# Patient Record
Sex: FEMALE | Race: WHITE | NOT HISPANIC OR LATINO | URBAN - METROPOLITAN AREA
[De-identification: names, ages, dates, MRNs, and addresses within clinical notes are randomized per-mention and may not be internally consistent; named-entity substitution may affect disease eponyms.]

---

## 2017-02-05 ENCOUNTER — INPATIENT (INPATIENT)
Facility: HOSPITAL | Age: 82
LOS: 1 days | Discharge: HOME | DRG: 312 | End: 2017-02-07
Attending: INTERNAL MEDICINE | Admitting: INTERNAL MEDICINE
Payer: MEDICARE

## 2017-02-05 VITALS
OXYGEN SATURATION: 100 % | TEMPERATURE: 98 F | DIASTOLIC BLOOD PRESSURE: 61 MMHG | HEIGHT: 59.06 IN | HEART RATE: 63 BPM | WEIGHT: 130.07 LBS | RESPIRATION RATE: 19 BRPM | SYSTOLIC BLOOD PRESSURE: 178 MMHG

## 2017-02-05 DIAGNOSIS — S22.000A WEDGE COMPRESSION FRACTURE OF UNSPECIFIED THORACIC VERTEBRA, INITIAL ENCOUNTER FOR CLOSED FRACTURE: ICD-10-CM

## 2017-02-05 LAB
ALBUMIN SERPL ELPH-MCNC: 3.9 G/DL — SIGNIFICANT CHANGE UP (ref 3.5–5)
ALP SERPL-CCNC: 46 U/L — SIGNIFICANT CHANGE UP (ref 40–120)
ALT FLD-CCNC: 30 U/L DA — SIGNIFICANT CHANGE UP (ref 10–60)
ANION GAP SERPL CALC-SCNC: 8 MMOL/L — SIGNIFICANT CHANGE UP (ref 5–17)
APTT BLD: 27.7 SEC — SIGNIFICANT CHANGE UP (ref 27.5–37.4)
AST SERPL-CCNC: 24 U/L — SIGNIFICANT CHANGE UP (ref 10–40)
BASOPHILS # BLD AUTO: 0.1 K/UL — SIGNIFICANT CHANGE UP (ref 0–0.2)
BASOPHILS NFR BLD AUTO: 1.1 % — SIGNIFICANT CHANGE UP (ref 0–2)
BILIRUB SERPL-MCNC: 0.2 MG/DL — SIGNIFICANT CHANGE UP (ref 0.2–1.2)
BUN SERPL-MCNC: 14 MG/DL — SIGNIFICANT CHANGE UP (ref 7–18)
CALCIUM SERPL-MCNC: 8.9 MG/DL — SIGNIFICANT CHANGE UP (ref 8.4–10.5)
CHLORIDE SERPL-SCNC: 101 MMOL/L — SIGNIFICANT CHANGE UP (ref 96–108)
CO2 SERPL-SCNC: 28 MMOL/L — SIGNIFICANT CHANGE UP (ref 22–31)
CREAT SERPL-MCNC: 0.87 MG/DL — SIGNIFICANT CHANGE UP (ref 0.5–1.3)
EOSINOPHIL # BLD AUTO: 0.1 K/UL — SIGNIFICANT CHANGE UP (ref 0–0.5)
EOSINOPHIL NFR BLD AUTO: 2.8 % — SIGNIFICANT CHANGE UP (ref 0–6)
GLUCOSE SERPL-MCNC: 130 MG/DL — HIGH (ref 70–99)
HCT VFR BLD CALC: 41.6 % — SIGNIFICANT CHANGE UP (ref 34.5–45)
HGB BLD-MCNC: 13 G/DL — SIGNIFICANT CHANGE UP (ref 11.5–15.5)
INR BLD: 1.04 RATIO — SIGNIFICANT CHANGE UP (ref 0.88–1.16)
LYMPHOCYTES # BLD AUTO: 2.1 K/UL — SIGNIFICANT CHANGE UP (ref 1–3.3)
LYMPHOCYTES # BLD AUTO: 41.2 % — SIGNIFICANT CHANGE UP (ref 13–44)
MCHC RBC-ENTMCNC: 26.7 PG — LOW (ref 27–34)
MCHC RBC-ENTMCNC: 31.2 GM/DL — LOW (ref 32–36)
MCV RBC AUTO: 85.6 FL — SIGNIFICANT CHANGE UP (ref 80–100)
MONOCYTES # BLD AUTO: 0.3 K/UL — SIGNIFICANT CHANGE UP (ref 0–0.9)
MONOCYTES NFR BLD AUTO: 5.9 % — SIGNIFICANT CHANGE UP (ref 2–14)
NEUTROPHILS # BLD AUTO: 2.5 K/UL — SIGNIFICANT CHANGE UP (ref 1.8–7.4)
NEUTROPHILS NFR BLD AUTO: 48.9 % — SIGNIFICANT CHANGE UP (ref 43–77)
PLATELET # BLD AUTO: 136 K/UL — LOW (ref 150–400)
POTASSIUM SERPL-MCNC: 4.2 MMOL/L — SIGNIFICANT CHANGE UP (ref 3.5–5.3)
POTASSIUM SERPL-SCNC: 4.2 MMOL/L — SIGNIFICANT CHANGE UP (ref 3.5–5.3)
PROT SERPL-MCNC: 9.1 G/DL — HIGH (ref 6–8.3)
PROTHROM AB SERPL-ACNC: 11.6 SEC — SIGNIFICANT CHANGE UP (ref 10–13.1)
RBC # BLD: 4.86 M/UL — SIGNIFICANT CHANGE UP (ref 3.8–5.2)
RBC # FLD: 11.8 % — SIGNIFICANT CHANGE UP (ref 10.3–14.5)
SODIUM SERPL-SCNC: 137 MMOL/L — SIGNIFICANT CHANGE UP (ref 135–145)
WBC # BLD: 5.1 K/UL — SIGNIFICANT CHANGE UP (ref 3.8–10.5)
WBC # FLD AUTO: 5.1 K/UL — SIGNIFICANT CHANGE UP (ref 3.8–10.5)

## 2017-02-05 PROCEDURE — 99285 EMERGENCY DEPT VISIT HI MDM: CPT

## 2017-02-05 PROCEDURE — 70450 CT HEAD/BRAIN W/O DYE: CPT | Mod: 26

## 2017-02-05 PROCEDURE — 72131 CT LUMBAR SPINE W/O DYE: CPT | Mod: 26

## 2017-02-05 PROCEDURE — 71101 X-RAY EXAM UNILAT RIBS/CHEST: CPT | Mod: 26

## 2017-02-05 RX ORDER — DEXAMETHASONE 0.5 MG/5ML
10 ELIXIR ORAL ONCE
Qty: 0 | Refills: 0 | Status: COMPLETED | OUTPATIENT
Start: 2017-02-05 | End: 2017-02-05

## 2017-02-05 RX ORDER — IBUPROFEN 200 MG
600 TABLET ORAL ONCE
Qty: 0 | Refills: 0 | Status: COMPLETED | OUTPATIENT
Start: 2017-02-05 | End: 2017-02-05

## 2017-02-05 RX ORDER — MECLIZINE HCL 12.5 MG
25 TABLET ORAL ONCE
Qty: 0 | Refills: 0 | Status: COMPLETED | OUTPATIENT
Start: 2017-02-05 | End: 2017-02-05

## 2017-02-05 RX ADMIN — Medication 600 MILLIGRAM(S): at 22:12

## 2017-02-05 RX ADMIN — Medication 600 MILLIGRAM(S): at 23:00

## 2017-02-05 RX ADMIN — Medication 10 MILLIGRAM(S): at 20:57

## 2017-02-05 RX ADMIN — Medication 25 MILLIGRAM(S): at 22:12

## 2017-02-05 NOTE — ED PROVIDER NOTE - CONDUCTED A DETAILED DISCUSSION WITH PATIENT AND/OR GUARDIAN REGARDING, MDM
need for outpatient follow-up/return to ED if symptoms worsen, persist or questions arise/lab results radiology results/return to ED if symptoms worsen, persist or questions arise/need for outpatient follow-up/lab results

## 2017-02-05 NOTE — ED PROVIDER NOTE - NS ED MD SCRIBE ATTENDING SCRIBE SECTIONS
DISPOSITION/HIV/PAST MEDICAL/SURGICAL/SOCIAL HISTORY/REVIEW OF SYSTEMS/PHYSICAL EXAM/HISTORY OF PRESENT ILLNESS/VITAL SIGNS( [place])

## 2017-02-05 NOTE — ED PROVIDER NOTE - MEDICAL DECISION MAKING DETAILS
Pt with mechanical fall without dizziness yesterday, no gross neuro deficits on exam. Will get CT lumbar spine, EKG, then reassess. Pt with mechanical fall yesterday, no neuro deficits on exam. Will get CT lumbar spine, xray, EKG, labs and re-assess.  Ct shows compression fracture. Will admit

## 2017-02-05 NOTE — ED ADULT NURSE NOTE - OBJECTIVE STATEMENT
as per the daughter pt had the syncope episode in [employer] . she is being having theses episodes after the hip replacement

## 2017-02-05 NOTE — ED PROVIDER NOTE - ATTENDING CONTRIBUTION TO CARE
87yo F w mechanical fall yesterday w L sided rib pains and mid back pains. No neuro stx. Tender to T12-L2 area, neuro intact. Will treat, get Xrays/CT, will reevaluate

## 2017-02-05 NOTE — ED PROVIDER NOTE - OBJECTIVE STATEMENT
87 y/o female with PMHx of HTN and chronic dizziness presents to the ED for fall pt reports yesterday while waking without a cane she developed a gradual onset of spinning sensation described as dizziness (not new from baseline). While going down the stairs, she lost balance at the last step and fell forward and landed on the L side. Pt feels spinning sensations with changes of position. Pt now c/o of back pain and L sided rib pain. Pt has been f/u with her PMD for chronic dizziness in the last year with multiple CT scans and MRIs and is currently on Meclizine. Pt denies different presentation of Sx than last year. Pt denies head injury, LOC, CP, palpitation, SOB, prior or post fall, visual changes, nausea, vomiting, confusion, sensory motor deficits, or any other complaints. 87 y/o female with PMHx of HTN and chronic dizziness presents to the ED for fall pt reports yesterday while waking without a cane she developed spinning sensation described as dizziness (not new from baseline). While going down the stairs, she lost balance at the last step and fell forward and landed on the L side. Pt now c/o of back pain and L sided rib pain. Pt has been f/u with her PMD for chronic dizziness in the last year with multiple CT scans and MRIs and is currently on Meclizine. Pt denies different presentation of Sx than last year. Pt denies head injury, LOC, CP, palpitation, SOB, prior or post fall, visual changes, nausea, vomiting, confusion, sensory motor deficits, or any other complaints.

## 2017-02-06 DIAGNOSIS — Z96.642 PRESENCE OF LEFT ARTIFICIAL HIP JOINT: Chronic | ICD-10-CM

## 2017-02-06 DIAGNOSIS — S22.000A WEDGE COMPRESSION FRACTURE OF UNSPECIFIED THORACIC VERTEBRA, INITIAL ENCOUNTER FOR CLOSED FRACTURE: ICD-10-CM

## 2017-02-06 DIAGNOSIS — F32.9 MAJOR DEPRESSIVE DISORDER, SINGLE EPISODE, UNSPECIFIED: ICD-10-CM

## 2017-02-06 DIAGNOSIS — Z90.710 ACQUIRED ABSENCE OF BOTH CERVIX AND UTERUS: Chronic | ICD-10-CM

## 2017-02-06 DIAGNOSIS — Z41.8 ENCOUNTER FOR OTHER PROCEDURES FOR PURPOSES OTHER THAN REMEDYING HEALTH STATE: ICD-10-CM

## 2017-02-06 DIAGNOSIS — I10 ESSENTIAL (PRIMARY) HYPERTENSION: ICD-10-CM

## 2017-02-06 DIAGNOSIS — R55 SYNCOPE AND COLLAPSE: ICD-10-CM

## 2017-02-06 LAB
ANION GAP SERPL CALC-SCNC: 9 MMOL/L — SIGNIFICANT CHANGE UP (ref 5–17)
BASOPHILS # BLD AUTO: 0 K/UL — SIGNIFICANT CHANGE UP (ref 0–0.2)
BASOPHILS NFR BLD AUTO: 0.4 % — SIGNIFICANT CHANGE UP (ref 0–2)
BUN SERPL-MCNC: 19 MG/DL — HIGH (ref 7–18)
CALCIUM SERPL-MCNC: 8.8 MG/DL — SIGNIFICANT CHANGE UP (ref 8.4–10.5)
CHLORIDE SERPL-SCNC: 104 MMOL/L — SIGNIFICANT CHANGE UP (ref 96–108)
CHOLEST SERPL-MCNC: 183 MG/DL — SIGNIFICANT CHANGE UP (ref 10–199)
CK MB BLD-MCNC: <1.2 % — SIGNIFICANT CHANGE UP (ref 0–3.5)
CK MB CFR SERPL CALC: <1 NG/ML — SIGNIFICANT CHANGE UP (ref 0–3.6)
CK SERPL-CCNC: 81 U/L — SIGNIFICANT CHANGE UP (ref 21–215)
CO2 SERPL-SCNC: 27 MMOL/L — SIGNIFICANT CHANGE UP (ref 22–31)
CREAT SERPL-MCNC: 0.85 MG/DL — SIGNIFICANT CHANGE UP (ref 0.5–1.3)
EOSINOPHIL # BLD AUTO: 0 K/UL — SIGNIFICANT CHANGE UP (ref 0–0.5)
EOSINOPHIL NFR BLD AUTO: 0.3 % — SIGNIFICANT CHANGE UP (ref 0–6)
FOLATE SERPL-MCNC: 11 NG/ML — SIGNIFICANT CHANGE UP (ref 4.8–24.2)
GLUCOSE SERPL-MCNC: 161 MG/DL — HIGH (ref 70–99)
HBA1C BLD-MCNC: 5.9 % — HIGH (ref 4–5.6)
HCT VFR BLD CALC: 39.5 % — SIGNIFICANT CHANGE UP (ref 34.5–45)
HDLC SERPL-MCNC: 59 MG/DL — SIGNIFICANT CHANGE UP (ref 40–125)
HGB BLD-MCNC: 12.5 G/DL — SIGNIFICANT CHANGE UP (ref 11.5–15.5)
LIPID PNL WITH DIRECT LDL SERPL: 118 MG/DL — SIGNIFICANT CHANGE UP
LYMPHOCYTES # BLD AUTO: 1.2 K/UL — SIGNIFICANT CHANGE UP (ref 1–3.3)
LYMPHOCYTES # BLD AUTO: 30.4 % — SIGNIFICANT CHANGE UP (ref 13–44)
MAGNESIUM SERPL-MCNC: 2.5 MG/DL — HIGH (ref 1.8–2.4)
MCHC RBC-ENTMCNC: 27.4 PG — SIGNIFICANT CHANGE UP (ref 27–34)
MCHC RBC-ENTMCNC: 31.7 GM/DL — LOW (ref 32–36)
MCV RBC AUTO: 86.4 FL — SIGNIFICANT CHANGE UP (ref 80–100)
MONOCYTES # BLD AUTO: 0 K/UL — SIGNIFICANT CHANGE UP (ref 0–0.9)
MONOCYTES NFR BLD AUTO: 1.2 % — LOW (ref 2–14)
NEUTROPHILS # BLD AUTO: 2.6 K/UL — SIGNIFICANT CHANGE UP (ref 1.8–7.4)
NEUTROPHILS NFR BLD AUTO: 67.7 % — SIGNIFICANT CHANGE UP (ref 43–77)
PHOSPHATE SERPL-MCNC: 2.9 MG/DL — SIGNIFICANT CHANGE UP (ref 2.5–4.5)
PLATELET # BLD AUTO: 124 K/UL — LOW (ref 150–400)
POTASSIUM SERPL-MCNC: 3.9 MMOL/L — SIGNIFICANT CHANGE UP (ref 3.5–5.3)
POTASSIUM SERPL-SCNC: 3.9 MMOL/L — SIGNIFICANT CHANGE UP (ref 3.5–5.3)
RBC # BLD: 4.57 M/UL — SIGNIFICANT CHANGE UP (ref 3.8–5.2)
RBC # FLD: 12 % — SIGNIFICANT CHANGE UP (ref 10.3–14.5)
SODIUM SERPL-SCNC: 140 MMOL/L — SIGNIFICANT CHANGE UP (ref 135–145)
T4 FREE SERPL-MCNC: 1.2 NG/DL — SIGNIFICANT CHANGE UP (ref 0.9–1.8)
TOTAL CHOLESTEROL/HDL RATIO MEASUREMENT: 3.1 RATIO — LOW (ref 3.3–7.1)
TRIGL SERPL-MCNC: 32 MG/DL — SIGNIFICANT CHANGE UP (ref 10–149)
TROPONIN I SERPL-MCNC: <0.015 NG/ML — SIGNIFICANT CHANGE UP (ref 0–0.04)
TSH SERPL-MCNC: 0.98 UU/ML — SIGNIFICANT CHANGE UP (ref 0.34–4.82)
VIT B12 SERPL-MCNC: 922 PG/ML — HIGH (ref 243–894)
WBC # BLD: 3.9 K/UL — SIGNIFICANT CHANGE UP (ref 3.8–10.5)
WBC # FLD AUTO: 3.9 K/UL — SIGNIFICANT CHANGE UP (ref 3.8–10.5)

## 2017-02-06 RX ORDER — ACETAMINOPHEN 500 MG
650 TABLET ORAL EVERY 6 HOURS
Qty: 0 | Refills: 0 | Status: DISCONTINUED | OUTPATIENT
Start: 2017-02-06 | End: 2017-02-07

## 2017-02-06 RX ORDER — ESCITALOPRAM OXALATE 10 MG/1
5 TABLET, FILM COATED ORAL DAILY
Qty: 0 | Refills: 0 | Status: DISCONTINUED | OUTPATIENT
Start: 2017-02-06 | End: 2017-02-07

## 2017-02-06 RX ORDER — AMLODIPINE BESYLATE 2.5 MG/1
5 TABLET ORAL DAILY
Qty: 0 | Refills: 0 | Status: DISCONTINUED | OUTPATIENT
Start: 2017-02-06 | End: 2017-02-07

## 2017-02-06 RX ORDER — TRAMADOL HYDROCHLORIDE 50 MG/1
50 TABLET ORAL EVERY 6 HOURS
Qty: 0 | Refills: 0 | Status: DISCONTINUED | OUTPATIENT
Start: 2017-02-06 | End: 2017-02-07

## 2017-02-06 RX ORDER — LEVOTHYROXINE SODIUM 125 MCG
100 TABLET ORAL DAILY
Qty: 0 | Refills: 0 | Status: DISCONTINUED | OUTPATIENT
Start: 2017-02-06 | End: 2017-02-07

## 2017-02-06 RX ORDER — HEPARIN SODIUM 5000 [USP'U]/ML
5000 INJECTION INTRAVENOUS; SUBCUTANEOUS EVERY 8 HOURS
Qty: 0 | Refills: 0 | Status: DISCONTINUED | OUTPATIENT
Start: 2017-02-06 | End: 2017-02-07

## 2017-02-06 RX ORDER — CARVEDILOL PHOSPHATE 80 MG/1
12.5 CAPSULE, EXTENDED RELEASE ORAL EVERY 12 HOURS
Qty: 0 | Refills: 0 | Status: DISCONTINUED | OUTPATIENT
Start: 2017-02-06 | End: 2017-02-06

## 2017-02-06 RX ADMIN — AMLODIPINE BESYLATE 5 MILLIGRAM(S): 2.5 TABLET ORAL at 05:51

## 2017-02-06 RX ADMIN — TRAMADOL HYDROCHLORIDE 50 MILLIGRAM(S): 50 TABLET ORAL at 21:21

## 2017-02-06 RX ADMIN — ESCITALOPRAM OXALATE 5 MILLIGRAM(S): 10 TABLET, FILM COATED ORAL at 12:20

## 2017-02-06 RX ADMIN — Medication 100 MICROGRAM(S): at 05:51

## 2017-02-06 RX ADMIN — HEPARIN SODIUM 5000 UNIT(S): 5000 INJECTION INTRAVENOUS; SUBCUTANEOUS at 05:50

## 2017-02-06 RX ADMIN — HEPARIN SODIUM 5000 UNIT(S): 5000 INJECTION INTRAVENOUS; SUBCUTANEOUS at 13:20

## 2017-02-06 RX ADMIN — TRAMADOL HYDROCHLORIDE 50 MILLIGRAM(S): 50 TABLET ORAL at 20:11

## 2017-02-06 NOTE — H&P ADULT. - CONSTITUTIONAL DETAILS
well-nourished/well-groomed/no distress/well-developed no distress/well-nourished/obese/well-groomed/well-developed

## 2017-02-06 NOTE — H&P ADULT. - PROBLEM SELECTOR PLAN 1
possibly 2/2 arrhythmia or symptomatic bradycardia vs vasovagal vs peripheral vertigo (?fall then LOC).  Head CT negative for infarct or hemorrhage, CVA unlikely as no neurological deficits, syncope is not CVA presentation.  possibly vertigo, then fall and LOC.  possibly 2/2 bradycardia  - admit to telemetry to monitor for arrhythmia; ACS not likely as no chest pain, no EKG changes, T1 negative.  no need to trend troponins  - TTE (last TTE in 2015)  - hold BB 2/2 sinus bradycardia  - check B12, TSH possibly 2/2 arrhythmia or symptomatic bradycardia vs vasovagal vs peripheral vertigo (?fall then LOC).  Head CT negative for infarct or hemorrhage, CVA unlikely as no neurological deficits, syncope is not CVA presentation.  possibly vertigo, then fall and LOC.  possibly 2/2 bradycardia  - admit to telemetry to monitor for arrhythmia; ACS not likely as no chest pain, no EKG changes, T1 negative. trend troponins  - TTE (last TTE in 2015)  - hold BB 2/2 sinus bradycardia  - check B12, TSH  - Dr Booker (cardiology) consulted

## 2017-02-06 NOTE — H&P ADULT. - PROBLEM SELECTOR PLAN 2
no neurological deficits noted on exam; no history of urinary or fecal incontinence.  no need for continuation of steroids (d/w Dr Fonseca).  - Dr Fonseca consulted - likely no need for MRI as no neuro deficits  - pain management  - neuro checks q1, fall precautions

## 2017-02-06 NOTE — H&P ADULT. - FAMILY HISTORY
Mother  Still living? Unknown  Family history of colon cancer in mother, Age at diagnosis: Age Unknown     Father  Still living? Unknown  Family history of diabetes mellitus, Age at diagnosis: Age Unknown     Sibling  Still living? Unknown  Family history of diabetes mellitus, Age at diagnosis: Age Unknown  Family history of leukemia, Age at diagnosis: Age Unknown

## 2017-02-06 NOTE — PATIENT PROFILE ADULT. - NS TRANSFER PATIENT BELONGINGS
Jewelry/Money (specify)/Cell Phone/PDA (specify)/yellow wedding band, bracelet, necklace, about $80/Clothing

## 2017-02-06 NOTE — H&P ADULT. - RADIOLOGY RESULTS AND INTERPRETATION
Head CT: no infarct or hemorrhage Head CT: no infarct or hemorrhage  CXR and rib series: no rib fracture; density RUL ? pulmonary contusion

## 2017-02-06 NOTE — H&P ADULT. - ATTENDING COMMENTS
Patient seen and examined; Spoke with PGY2 MATTEO Ross this morning.    87yo Nigerian-speaking female from home, walks with walker, HHA 9 hours Sun-Fri, PMH HTN, depression, chronic dizziness (on meclizine, outpatient Head CT and MRI negative, as per daughter at bedside), h/o Hepatitis C (treated in 2016), hypothyroidism p/w syncope and fall. PGY2 spoke with Daughter Marina, [phone], at bedside to translate.  Patient was at [employer] yesterday when sat up from sitting position, walked down 1-2 stairs and fell backwards with LOC.  Patient cannot recall whether had any symptoms prior to falling, including change in vision, dizziness, chest pain.  Patient awoke without change in mental status and able to walk home with family. Family brought patient to ED 2/2 complaint of back pain.  Daughter states that patient has fallen 5 times in past year and has noticed that patient will sway to right when walks.    Spoke with patient with Ally Ruelas at [facility]. c/o pain on ambulation and feels dizzy. Orthostasis done earlier today positive.    SH:  FH: DM, HTN, Colon ca    Vitals:     P/E:  Neuro: AAO x 3; No focal deficits; Gait balanced but has pain on ambulation  CVS: S1S2 present; regular  Resp: BLAE+, No wheeze or Rhonchi  GI: Soft, BS Patient seen and examined; Spoke with PGY2 MATTEO Ross this morning.    85yo Martiniquais-speaking female from home, walks with walker, HHA 9 hours Sun-Fri, PMH HTN, depression, chronic dizziness (on meclizine, outpatient Head CT and MRI negative, as per daughter at bedside), h/o Hepatitis C (treated in 2016), hypothyroidism p/w syncope and fall. PGY2 spoke with Daughter Marina, [phone], at bedside to translate.  Patient was at [employer] yesterday when sat up from sitting position, walked down 1-2 stairs and fell backwards with LOC.  Patient cannot recall whether had any symptoms prior to falling, including change in vision, dizziness, chest pain.  Patient awoke without change in mental status and able to walk home with family. Family brought patient to ED 2/2 complaint of back pain.  Daughter states that patient has fallen 5 times in past year and has noticed that patient will sway to right when walks.    Spoke with patient with Ally Ruelas at [facility]. c/o pain on ambulation and feels dizzy. Orthostasis done earlier today positive.    SH:  FH: DM, HTN, Colon ca    Vitals:     P/E:  Neuro: AAO x 3; No focal deficits; Gait balanced but has pain on ambulation  CVS: S1S2 present; regular  Resp: BLAE+, No wheeze or Rhonchi  GI: Soft, BS+, Non tender  Spine: tenderness thoracolumbar area Patient seen and examined; Spoke with PGY2 MATTEO Ross this morning.    87yo Czech-speaking female from home, walks with walker, HHA 9 hours Sun-Fri, PMH HTN, depression, chronic dizziness (on meclizine, outpatient Head CT and MRI negative, as per daughter at bedside), h/o Hepatitis C (treated in 2016), hypothyroidism p/w syncope and fall. PGY2 spoke with Daughter Marina, [phone], at bedside to translate.  Patient was at [employer] yesterday when sat up from sitting position, walked down 1-2 stairs and fell backwards with LOC.  Patient cannot recall whether had any symptoms prior to falling, including change in vision, dizziness, chest pain.  Patient awoke without change in mental status and able to walk home with family. Family brought patient to ED 2/2 complaint of back pain.  Daughter states that patient has fallen 5 times in past year and has noticed that patient will sway to right when walks.    [facility] [place] [facility] c/o pain on ambulation and feels dizzy. Orthostasis done earlier today positive.    SH:  FH: DM, HTN, Colon ca    Vitals:     P/E:  Neuro: AAO x 3; No focal deficits; Gait balanced but has pain on ambulation  CVS: S1S2 present; regular  Resp: BLAE+, No wheeze or Rhonchi  GI: Soft, BS+, Non tender  Spine: tenderness thoracolumbar area    D/D:  Syncope concern for arrhythmia, likely related to delayed postural reflexes with possible Hypotension secondary to drugs contributing  Hx HTN  T12 compression fracture with acute pain    Plan:  Admit to Tele; Reduce Coreg by half due to Bradycardia and low normal BP  Continue Amlodipine  Sharon control Tramadol prn  PT evaluation  Cardiology consulted Dr. Booker; Observe TELE FOR 24 HRS

## 2017-02-06 NOTE — H&P ADULT. - MUSCULOSKELETAL
details… detailed exam ROM intact/no joint warmth/no calf tenderness/no joint erythema/no joint swelling

## 2017-02-06 NOTE — H&P ADULT. - HISTORY OF PRESENT ILLNESS
85yo Maltese-speaking female from home, walks with walker, HHA 9 hours Sun-Fri, PMH HTN, depression, chronic dizziness (on meclizine 85yo Gambian-speaking female from home, walks with walker, HHA 9 hours Sun-Fri, PMH HTN, depression, chronic dizziness (on meclizine, outpatient Head CT and MRI negative, as per daughter at bedside), h/o Hepatitis C (treated in 2016), hypothyroidism p/w syncope and fall. Daughter Marina, [phone], at bedside to translate.  Patient was at [employer] yesterday when sat up from sitting position, walked down 1-2 stairs and fell backwards with LOC.  Patient cannot recall whether had any symptoms prior to falling, including change in vision, dizziness, chest pain.  Patient awoke without change in mental status and able to walk home with family. Family brought patient to ED 2/2 complaint of back pain.  Daughter states that patient has fallen 5 times in past year and has noticed that patient will sway to right when walks.  Patient has had outpatient CT scans and MRIs which have been non-contributory. Denies recent fever, chills, change in vision, chest pain, SOB, N/V, diarrhea, abdominal pain, dysuria, changes in sensation, paralysis, urinary or fecal incontinence. ROS + leg swelling in 2106 (resolved), URI treated with azithromycin in 2016. UTI treated with cipro and bactrim in 2016.     Of note, son  recently

## 2017-02-06 NOTE — H&P ADULT. - ASSESSMENT
85yo Iranian-speaking female from home, walks with walker, HHA 9 hours Sun-Fri, PMH HTN, depression, chronic dizziness (on meclizine, outpatient Head CT and MRI negative, as per daughter at bedside), h/o Hepatitis C (treated in 2016), hypothyroidism p/w syncope and fall possibly 2/2 symptomatic bradycardia vs vasovagal episode

## 2017-02-07 VITALS — SYSTOLIC BLOOD PRESSURE: 173 MMHG | DIASTOLIC BLOOD PRESSURE: 81 MMHG | HEART RATE: 65 BPM

## 2017-02-07 LAB
ANION GAP SERPL CALC-SCNC: 7 MMOL/L — SIGNIFICANT CHANGE UP (ref 5–17)
BUN SERPL-MCNC: 23 MG/DL — HIGH (ref 7–18)
CALCIUM SERPL-MCNC: 9.3 MG/DL — SIGNIFICANT CHANGE UP (ref 8.4–10.5)
CHLORIDE SERPL-SCNC: 106 MMOL/L — SIGNIFICANT CHANGE UP (ref 96–108)
CO2 SERPL-SCNC: 29 MMOL/L — SIGNIFICANT CHANGE UP (ref 22–31)
CREAT SERPL-MCNC: 1.07 MG/DL — SIGNIFICANT CHANGE UP (ref 0.5–1.3)
GLUCOSE SERPL-MCNC: 157 MG/DL — HIGH (ref 70–99)
HCT VFR BLD CALC: 41.2 % — SIGNIFICANT CHANGE UP (ref 34.5–45)
HGB BLD-MCNC: 13 G/DL — SIGNIFICANT CHANGE UP (ref 11.5–15.5)
MCHC RBC-ENTMCNC: 26.6 PG — LOW (ref 27–34)
MCHC RBC-ENTMCNC: 31.6 GM/DL — LOW (ref 32–36)
MCV RBC AUTO: 84.2 FL — SIGNIFICANT CHANGE UP (ref 80–100)
PLATELET # BLD AUTO: 153 K/UL — SIGNIFICANT CHANGE UP (ref 150–400)
POTASSIUM SERPL-MCNC: 5 MMOL/L — SIGNIFICANT CHANGE UP (ref 3.5–5.3)
POTASSIUM SERPL-SCNC: 5 MMOL/L — SIGNIFICANT CHANGE UP (ref 3.5–5.3)
RBC # BLD: 4.9 M/UL — SIGNIFICANT CHANGE UP (ref 3.8–5.2)
RBC # FLD: 11.8 % — SIGNIFICANT CHANGE UP (ref 10.3–14.5)
SODIUM SERPL-SCNC: 142 MMOL/L — SIGNIFICANT CHANGE UP (ref 135–145)
T PALLIDUM AB TITR SER: NEGATIVE — SIGNIFICANT CHANGE UP
WBC # BLD: 8.9 K/UL — SIGNIFICANT CHANGE UP (ref 3.8–10.5)
WBC # FLD AUTO: 8.9 K/UL — SIGNIFICANT CHANGE UP (ref 3.8–10.5)

## 2017-02-07 PROCEDURE — 82607 VITAMIN B-12: CPT

## 2017-02-07 PROCEDURE — 84443 ASSAY THYROID STIM HORMONE: CPT

## 2017-02-07 PROCEDURE — 93005 ELECTROCARDIOGRAM TRACING: CPT

## 2017-02-07 PROCEDURE — 86780 TREPONEMA PALLIDUM: CPT

## 2017-02-07 PROCEDURE — 80061 LIPID PANEL: CPT

## 2017-02-07 PROCEDURE — 85027 COMPLETE CBC AUTOMATED: CPT

## 2017-02-07 PROCEDURE — 83735 ASSAY OF MAGNESIUM: CPT

## 2017-02-07 PROCEDURE — 82550 ASSAY OF CK (CPK): CPT

## 2017-02-07 PROCEDURE — 70450 CT HEAD/BRAIN W/O DYE: CPT

## 2017-02-07 PROCEDURE — 84100 ASSAY OF PHOSPHORUS: CPT

## 2017-02-07 PROCEDURE — 85610 PROTHROMBIN TIME: CPT

## 2017-02-07 PROCEDURE — 83036 HEMOGLOBIN GLYCOSYLATED A1C: CPT

## 2017-02-07 PROCEDURE — 84484 ASSAY OF TROPONIN QUANT: CPT

## 2017-02-07 PROCEDURE — 71101 X-RAY EXAM UNILAT RIBS/CHEST: CPT

## 2017-02-07 PROCEDURE — 85730 THROMBOPLASTIN TIME PARTIAL: CPT

## 2017-02-07 PROCEDURE — 80053 COMPREHEN METABOLIC PANEL: CPT

## 2017-02-07 PROCEDURE — 99285 EMERGENCY DEPT VISIT HI MDM: CPT | Mod: 25

## 2017-02-07 PROCEDURE — 82746 ASSAY OF FOLIC ACID SERUM: CPT

## 2017-02-07 PROCEDURE — 84439 ASSAY OF FREE THYROXINE: CPT

## 2017-02-07 PROCEDURE — 80048 BASIC METABOLIC PNL TOTAL CA: CPT

## 2017-02-07 PROCEDURE — 82553 CREATINE MB FRACTION: CPT

## 2017-02-07 PROCEDURE — 72131 CT LUMBAR SPINE W/O DYE: CPT

## 2017-02-07 RX ORDER — CARVEDILOL PHOSPHATE 80 MG/1
6.25 CAPSULE, EXTENDED RELEASE ORAL ONCE
Qty: 0 | Refills: 0 | Status: COMPLETED | OUTPATIENT
Start: 2017-02-07 | End: 2017-02-07

## 2017-02-07 RX ORDER — TRAMADOL HYDROCHLORIDE 50 MG/1
1 TABLET ORAL
Qty: 60 | Refills: 0 | OUTPATIENT
Start: 2017-02-07 | End: 2017-03-09

## 2017-02-07 RX ORDER — CARVEDILOL PHOSPHATE 80 MG/1
1 CAPSULE, EXTENDED RELEASE ORAL
Qty: 0 | Refills: 0 | COMMUNITY

## 2017-02-07 RX ORDER — CARVEDILOL PHOSPHATE 80 MG/1
1 CAPSULE, EXTENDED RELEASE ORAL
Qty: 60 | Refills: 0 | OUTPATIENT
Start: 2017-02-07 | End: 2017-03-09

## 2017-02-07 RX ORDER — MECLIZINE HCL 12.5 MG
1 TABLET ORAL
Qty: 0 | Refills: 0 | COMMUNITY

## 2017-02-07 RX ORDER — TRAMADOL HYDROCHLORIDE 50 MG/1
50 TABLET ORAL EVERY 12 HOURS
Qty: 0 | Refills: 0 | Status: DISCONTINUED | OUTPATIENT
Start: 2017-02-07 | End: 2017-02-07

## 2017-02-07 RX ORDER — CARVEDILOL PHOSPHATE 80 MG/1
1 CAPSULE, EXTENDED RELEASE ORAL
Qty: 0 | Refills: 0 | COMMUNITY
Start: 2017-02-07

## 2017-02-07 RX ORDER — LINACLOTIDE 145 UG/1
1 CAPSULE, GELATIN COATED ORAL
Qty: 0 | Refills: 0 | COMMUNITY

## 2017-02-07 RX ADMIN — Medication 10 MILLIGRAM(S): at 09:38

## 2017-02-07 RX ADMIN — ESCITALOPRAM OXALATE 5 MILLIGRAM(S): 10 TABLET, FILM COATED ORAL at 12:03

## 2017-02-07 RX ADMIN — HEPARIN SODIUM 5000 UNIT(S): 5000 INJECTION INTRAVENOUS; SUBCUTANEOUS at 06:44

## 2017-02-07 RX ADMIN — AMLODIPINE BESYLATE 5 MILLIGRAM(S): 2.5 TABLET ORAL at 06:44

## 2017-02-07 RX ADMIN — CARVEDILOL PHOSPHATE 6.25 MILLIGRAM(S): 80 CAPSULE, EXTENDED RELEASE ORAL at 16:12

## 2017-02-07 RX ADMIN — Medication 100 MICROGRAM(S): at 06:44

## 2017-02-07 NOTE — DISCHARGE NOTE ADULT - SECONDARY DIAGNOSIS.
Compression fracture of thoracic vertebra, closed, initial encounter HTN (hypertension) Depression, unspecified depression type Hepatitis C virus infection, unspecified chronicity

## 2017-02-07 NOTE — DISCHARGE NOTE ADULT - MEDICATION SUMMARY - MEDICATIONS TO CHANGE
I will SWITCH the dose or number of times a day I take the medications listed below when I get home from the hospital:    Coreg 25 mg oral tablet  -- 1 tab(s) by mouth 2 times a day

## 2017-02-07 NOTE — DISCHARGE NOTE ADULT - MEDICATION SUMMARY - MEDICATIONS TO STOP TAKING
I will STOP taking the medications listed below when I get home from the hospital:    meclizine 12.5 mg oral tablet  -- 1 tab(s) by mouth 2 times a day

## 2017-02-07 NOTE — DISCHARGE NOTE ADULT - PLAN OF CARE
resolution. Avoid falls and new fractures You should use you're cane at all time to avoid gait imbalance and future falls. This is probably secondary to delayed postural reflexes, gait imbalance and also decrease blood pressure secondary to blood pressure meds. Your Coreg dose 25 mg is probably too high  for you and this was causing slow heart rate a low blood pressure which causes the dizziness and frequent falls. Have fall precautions at all times. You should start Coreg 6.25 mg 2 times a day and  hold it if blood pressure goes below 120/80. Follow up with primary care physician Dr. Davis to monitor blood pressure and adjust medications if necessary. Hold meclizine for now as we think is not needed. Restart it if dizziness continues decrease pain Orthopedics recommended early mobilization and PT. Follow up with primary care physician if pain gets worst. maintain BP<150/90 continue with Amlodipine home dose. Adjust Coreg to 6.25 mg 2 times a day. If Blood pressure below 120/80 hold Coreg. improve quality of life continue with Lexapro monitor not on any treatment right now You should use you're cane and walker at all time to avoid gait imbalance and future falls. This is probably secondary to delayed postural reflexes, gait imbalance and also possible  decrease blood pressure secondary to blood pressure meds. Your Coreg dose 25 mg is probably too high  for you and this was causing slow heart rate a low blood pressure which causes the dizziness and frequent falls. Have fall precautions at all times. You should start Coreg 6.25 mg 2 times a day and  hold it if blood pressure goes below 120/80. Follow up with primary care physician Dr. Davis (in 3 days Friday) to monitor blood pressure and adjust medications if necessary. Hold meclizine for now as we think is not needed and dizziness could be due to blood pressure medication. Restart it if dizziness continues decrease pain and help with ambulation Orthopedics (Dr. Eid) recommended early mobilization and PT and no surgical intervention. Follow up with primary care physician if pain gets worst. continue with Amlodipine home dose. We decreased Coreg to 6.25 mg 2 times a day. If Blood pressure below 120/80 hold Coreg. Follow up with PCP, if BP elevated more than 140/90, may increase Coreg. not on any treatment right now; Completed treatment.

## 2017-02-07 NOTE — DISCHARGE NOTE ADULT - HOSPITAL COURSE
87 yo  female from home, walks with walker, HHA 9 hours Sun-Fri, PMH HTN, depression,, h/o Hepatitis C (treated in 2016), hypothyroidism p/w syncope and fall. Patient was at [employer] when sat up from sitting position, walked down 1-2 stairs and fell backwards with LOC.  Patient cannot recall whether had any symptoms prior to falling, including change in vision, dizziness, chest pain.  Patient awoke without change in mental status and able to walk home with family. Family brought patient to ED 2/2 complaint of back pain.  Daughter states that patient has fallen 5 times in past year and has noticed that patient will sway to right when walks.  Patient has had outpatient CT scans and MRIs which have been non-contributory. Denies recent fever, chills, change in vision, chest pain, SOB, N/V, diarrhea, abdominal pain, dysuria, changes in sensation, paralysis, urinary or fecal incontinence. Pt was admitted due to dizziness and bradycardia  probably secondary to decrease postural reflexes and decrease BP secondary to anti BP medication. Pt was complaining of back pain and orthopedics were consulted. Lumbar CT scan showed compression fracture at T12 with 5 mm bony retropulsion. They recommended pain control, early mobilization and pain would improve as fracture heals. After 1 day of observation pt had no issues, was ambulation without difficulty.  In concerns that bradycardia and dizziness might be from high doses of Coreg as pt was taking 25 mg BID we would decrease dose to 6.25 mg BID. Pt is advice to continue this medication and follow up with primary  care physician Dr. Davis  on Friday to monitor BP and heart rate and if pt BP or HR and no stable optimize Coreg dose as needed. Pt ambulating without SOB dizziness and is stable for discharge. 87 yo  female from home, walks with walker, HHA 9 hours Sun-Fri, PMH HTN, depression,, h/o Hepatitis C (treated in 2016), hypothyroidism p/w syncope and fall. Patient was at [employer] when sat up from sitting position, walked down 1-2 stairs and fell backwards with LOC.  Patient cannot recall whether had any symptoms prior to falling, including change in vision, dizziness, chest pain.  Patient awoke without change in mental status and able to walk home with family. Family brought patient to ED 2/2 complaint of back pain.  Daughter states that patient has fallen 5 times in past year and has noticed that patient will sway to right when walks.  Patient has had outpatient CT scans and MRIs which have been non-contributory. Denies recent fever, chills, change in vision, chest pain, SOB, N/V, diarrhea, abdominal pain, dysuria, changes in sensation, paralysis, urinary or fecal incontinence. Pt was admitted due to dizziness and bradycardia  probably secondary to decrease postural reflexes and decrease BP secondary to anti BP medication. Pt was complaining of back pain and orthopedics were consulted.]    Lumbar CT scan showed compression fracture at T12 with 5 mm bony retropulsion. Othopedics consulted (Dr. Eid), recommended pain control, early mobilization and pain would improve as fracture heals. After 1 day of observation pt had no issues, was ambulation without difficulty.  In concerns that bradycardia and dizziness might be from high doses of Coreg as pt was taking 25 mg BID we would decrease dose to 6.25 mg twice daily. Pt is advice to continue this medication and follow up with primary  care physician Dr. Davis  on Friday to monitor BP and heart rate and if pt BP or HR and no stable optimize Coreg dose as needed. Pt ambulating without SOB dizziness and is stable for discharge.     I spoke with her granddaughter Ebony (Family Physician) reviewed findings, possible etiology and recommendations including change in dose of Coreg and outpatient follow up as discussed with PCP.

## 2017-02-07 NOTE — DISCHARGE NOTE ADULT - PATIENT PORTAL LINK FT
“You can access the Follow[employer] Patient Portal, offered by [facility], by registering with the following website: [email or web address]”

## 2017-02-07 NOTE — DISCHARGE NOTE ADULT - ADDITIONAL INSTRUCTIONS
Follow up with PCP Dr. Davis in 3 days (Friday).  Always ambulate with walker or cane.   Monitor BP; Adjust Coreg by PCP accordingly.

## 2017-02-07 NOTE — DISCHARGE NOTE ADULT - CARE PROVIDER_API CALL
Braxton Davis  [address], [place] [address]  Phone: (   )    -  Fax: (   )    -

## 2017-02-07 NOTE — DISCHARGE NOTE ADULT - CARE PLAN
Principal Discharge DX:	Dizziness  Goal:	resolution. Avoid falls and new fractures  Instructions for follow-up, activity and diet:	You should use you're cane at all time to avoid gait imbalance and future falls. This is probably secondary to delayed postural reflexes, gait imbalance and also decrease blood pressure secondary to blood pressure meds. Your Coreg dose 25 mg is probably too high  for you and this was causing slow heart rate a low blood pressure which causes the dizziness and frequent falls. Have fall precautions at all times. You should start Coreg 6.25 mg 2 times a day and  hold it if blood pressure goes below 120/80. Follow up with primary care physician Dr. Davis to monitor blood pressure and adjust medications if necessary. Hold meclizine for now as we think is not needed. Restart it if dizziness continues  Secondary Diagnosis:	Compression fracture of thoracic vertebra, closed, initial encounter  Goal:	decrease pain  Instructions for follow-up, activity and diet:	Orthopedics recommended early mobilization and PT. Follow up with primary care physician if pain gets worst.  Secondary Diagnosis:	HTN (hypertension)  Goal:	maintain BP<150/90  Instructions for follow-up, activity and diet:	continue with Amlodipine home dose. Adjust Coreg to 6.25 mg 2 times a day. If Blood pressure below 120/80 hold Coreg.  Secondary Diagnosis:	Depression, unspecified depression type  Goal:	improve quality of life  Instructions for follow-up, activity and diet:	continue with Lexapro  Secondary Diagnosis:	Hepatitis C virus infection, unspecified chronicity  Goal:	monitor  Instructions for follow-up, activity and diet:	not on any treatment right now Principal Discharge DX:	Dizziness  Goal:	resolution. Avoid falls and new fractures  Instructions for follow-up, activity and diet:	You should use you're cane and walker at all time to avoid gait imbalance and future falls. This is probably secondary to delayed postural reflexes, gait imbalance and also possible  decrease blood pressure secondary to blood pressure meds. Your Coreg dose 25 mg is probably too high  for you and this was causing slow heart rate a low blood pressure which causes the dizziness and frequent falls. Have fall precautions at all times. You should start Coreg 6.25 mg 2 times a day and  hold it if blood pressure goes below 120/80. Follow up with primary care physician Dr. Davis (in 3 days Friday) to monitor blood pressure and adjust medications if necessary. Hold meclizine for now as we think is not needed and dizziness could be due to blood pressure medication. Restart it if dizziness continues  Secondary Diagnosis:	Compression fracture of thoracic vertebra, closed, initial encounter  Goal:	decrease pain and help with ambulation  Instructions for follow-up, activity and diet:	Orthopedics (Dr. Eid) recommended early mobilization and PT and no surgical intervention. Follow up with primary care physician if pain gets worst.  Secondary Diagnosis:	HTN (hypertension)  Goal:	maintain BP<150/90  Instructions for follow-up, activity and diet:	continue with Amlodipine home dose. We decreased Coreg to 6.25 mg 2 times a day. If Blood pressure below 120/80 hold Coreg. Follow up with PCP, if BP elevated more than 140/90, may increase Coreg.  Secondary Diagnosis:	Depression, unspecified depression type  Goal:	improve quality of life  Instructions for follow-up, activity and diet:	continue with Lexapro  Secondary Diagnosis:	Hepatitis C virus infection, unspecified chronicity  Goal:	monitor  Instructions for follow-up, activity and diet:	not on any treatment right now; Completed treatment.

## 2017-02-07 NOTE — DISCHARGE NOTE ADULT - PROVIDER TOKENS
FREE:[LAST:[Susan],FIRST:[Braxton],PHONE:[(   )    -],FAX:[(   )    -],ADDRESS:[[address] [facility] [address], [place] [address]]]
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
## 2017-02-14 DIAGNOSIS — I10 ESSENTIAL (PRIMARY) HYPERTENSION: ICD-10-CM

## 2017-02-14 DIAGNOSIS — Z88.8 ALLERGY STATUS TO OTHER DRUGS, MEDICAMENTS AND BIOLOGICAL SUBSTANCES STATUS: ICD-10-CM

## 2017-02-14 DIAGNOSIS — T44.7X5A ADVERSE EFFECT OF BETA-ADRENORECEPTOR ANTAGONISTS, INITIAL ENCOUNTER: ICD-10-CM
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
## 2017-02-14 DIAGNOSIS — S22.080A WEDGE COMPRESSION FRACTURE OF T11-T12 VERTEBRA, INITIAL ENCOUNTER FOR CLOSED FRACTURE: ICD-10-CM

## 2017-02-14 DIAGNOSIS — F32.9 MAJOR DEPRESSIVE DISORDER, SINGLE EPISODE, UNSPECIFIED: ICD-10-CM

## 2017-02-14 DIAGNOSIS — Z91.81 HISTORY OF FALLING: ICD-10-CM

## 2017-02-14 DIAGNOSIS — Z28.21 IMMUNIZATION NOT CARRIED OUT BECAUSE OF PATIENT REFUSAL: ICD-10-CM

## 2017-02-14 DIAGNOSIS — Z90.710 ACQUIRED ABSENCE OF BOTH CERVIX AND UTERUS: ICD-10-CM

## 2017-02-14 DIAGNOSIS — Z96.642 PRESENCE OF LEFT ARTIFICIAL HIP JOINT: ICD-10-CM

## 2017-02-14 DIAGNOSIS — R00.1 BRADYCARDIA, UNSPECIFIED: ICD-10-CM

## 2017-02-14 DIAGNOSIS — I95.2 HYPOTENSION DUE TO DRUGS: ICD-10-CM

## 2017-02-14 DIAGNOSIS — R42 DIZZINESS AND GIDDINESS: ICD-10-CM

## 2017-02-14 DIAGNOSIS — Z80.6 FAMILY HISTORY OF LEUKEMIA: ICD-10-CM

## 2017-02-14 DIAGNOSIS — E03.9 HYPOTHYROIDISM, UNSPECIFIED: ICD-10-CM

## 2017-02-14 DIAGNOSIS — Z88.0 ALLERGY STATUS TO PENICILLIN: ICD-10-CM
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
## 2017-02-14 DIAGNOSIS — Z80.0 FAMILY HISTORY OF MALIGNANT NEOPLASM OF DIGESTIVE ORGANS: ICD-10-CM

## 2017-02-14 DIAGNOSIS — Z86.19 PERSONAL HISTORY OF OTHER INFECTIOUS AND PARASITIC DISEASES: ICD-10-CM

## 2017-02-14 DIAGNOSIS — Z83.3 FAMILY HISTORY OF DIABETES MELLITUS: ICD-10-CM

## 2017-02-14 DIAGNOSIS — I44.0 ATRIOVENTRICULAR BLOCK, FIRST DEGREE: ICD-10-CM

## 2017-08-25 ENCOUNTER — EMERGENCY (EMERGENCY)
Facility: HOSPITAL | Age: 82
LOS: 1 days | Discharge: HOME | End: 2017-08-25
Attending: EMERGENCY MEDICINE
Payer: MEDICARE

## 2017-08-25 VITALS
HEART RATE: 98 BPM | WEIGHT: 130.07 LBS | RESPIRATION RATE: 16 BRPM | DIASTOLIC BLOOD PRESSURE: 84 MMHG | SYSTOLIC BLOOD PRESSURE: 217 MMHG | OXYGEN SATURATION: 98 % | TEMPERATURE: 98 F

## 2017-08-25 VITALS — DIASTOLIC BLOOD PRESSURE: 57 MMHG | SYSTOLIC BLOOD PRESSURE: 155 MMHG

## 2017-08-25 DIAGNOSIS — I16.0 HYPERTENSIVE URGENCY: ICD-10-CM

## 2017-08-25 DIAGNOSIS — Z88.8 ALLERGY STATUS TO OTHER DRUGS, MEDICAMENTS AND BIOLOGICAL SUBSTANCES: ICD-10-CM

## 2017-08-25 DIAGNOSIS — Z96.642 PRESENCE OF LEFT ARTIFICIAL HIP JOINT: ICD-10-CM

## 2017-08-25 DIAGNOSIS — Z96.642 PRESENCE OF LEFT ARTIFICIAL HIP JOINT: Chronic | ICD-10-CM

## 2017-08-25 DIAGNOSIS — Z90.710 ACQUIRED ABSENCE OF BOTH CERVIX AND UTERUS: ICD-10-CM

## 2017-08-25 DIAGNOSIS — Z90.710 ACQUIRED ABSENCE OF BOTH CERVIX AND UTERUS: Chronic | ICD-10-CM

## 2017-08-25 DIAGNOSIS — Z88.0 ALLERGY STATUS TO PENICILLIN: ICD-10-CM

## 2017-08-25 DIAGNOSIS — R22.2 LOCALIZED SWELLING, MASS AND LUMP, TRUNK: ICD-10-CM

## 2017-08-25 LAB
ALBUMIN SERPL ELPH-MCNC: 3.6 G/DL — SIGNIFICANT CHANGE UP (ref 3.5–5)
ALP SERPL-CCNC: 45 U/L — SIGNIFICANT CHANGE UP (ref 40–120)
ALT FLD-CCNC: 27 U/L DA — SIGNIFICANT CHANGE UP (ref 10–60)
ANION GAP SERPL CALC-SCNC: 7 MMOL/L — SIGNIFICANT CHANGE UP (ref 5–17)
AST SERPL-CCNC: 18 U/L — SIGNIFICANT CHANGE UP (ref 10–40)
BASOPHILS # BLD AUTO: 0 K/UL — SIGNIFICANT CHANGE UP (ref 0–0.2)
BASOPHILS NFR BLD AUTO: 0.9 % — SIGNIFICANT CHANGE UP (ref 0–2)
BILIRUB SERPL-MCNC: 0.3 MG/DL — SIGNIFICANT CHANGE UP (ref 0.2–1.2)
BUN SERPL-MCNC: 10 MG/DL — SIGNIFICANT CHANGE UP (ref 7–18)
CALCIUM SERPL-MCNC: 9.4 MG/DL — SIGNIFICANT CHANGE UP (ref 8.4–10.5)
CHLORIDE SERPL-SCNC: 96 MMOL/L — SIGNIFICANT CHANGE UP (ref 96–108)
CO2 SERPL-SCNC: 29 MMOL/L — SIGNIFICANT CHANGE UP (ref 22–31)
CREAT SERPL-MCNC: 0.77 MG/DL — SIGNIFICANT CHANGE UP (ref 0.5–1.3)
EOSINOPHIL # BLD AUTO: 0.1 K/UL — SIGNIFICANT CHANGE UP (ref 0–0.5)
EOSINOPHIL NFR BLD AUTO: 2.4 % — SIGNIFICANT CHANGE UP (ref 0–6)
GLUCOSE SERPL-MCNC: 172 MG/DL — HIGH (ref 70–99)
HCT VFR BLD CALC: 39.8 % — SIGNIFICANT CHANGE UP (ref 34.5–45)
HGB BLD-MCNC: 12.6 G/DL — SIGNIFICANT CHANGE UP (ref 11.5–15.5)
LYMPHOCYTES # BLD AUTO: 1.6 K/UL — SIGNIFICANT CHANGE UP (ref 1–3.3)
LYMPHOCYTES # BLD AUTO: 31.4 % — SIGNIFICANT CHANGE UP (ref 13–44)
MCHC RBC-ENTMCNC: 26.9 PG — LOW (ref 27–34)
MCHC RBC-ENTMCNC: 31.8 GM/DL — LOW (ref 32–36)
MCV RBC AUTO: 84.6 FL — SIGNIFICANT CHANGE UP (ref 80–100)
MONOCYTES # BLD AUTO: 0.3 K/UL — SIGNIFICANT CHANGE UP (ref 0–0.9)
MONOCYTES NFR BLD AUTO: 5.2 % — SIGNIFICANT CHANGE UP (ref 2–14)
NEUTROPHILS # BLD AUTO: 3.1 K/UL — SIGNIFICANT CHANGE UP (ref 1.8–7.4)
NEUTROPHILS NFR BLD AUTO: 60.1 % — SIGNIFICANT CHANGE UP (ref 43–77)
NT-PROBNP SERPL-SCNC: 263 PG/ML — SIGNIFICANT CHANGE UP (ref 0–450)
PLATELET # BLD AUTO: 180 K/UL — SIGNIFICANT CHANGE UP (ref 150–400)
POTASSIUM SERPL-MCNC: 4.4 MMOL/L — SIGNIFICANT CHANGE UP (ref 3.5–5.3)
POTASSIUM SERPL-SCNC: 4.4 MMOL/L — SIGNIFICANT CHANGE UP (ref 3.5–5.3)
PROT SERPL-MCNC: 8.6 G/DL — HIGH (ref 6–8.3)
RBC # BLD: 4.7 M/UL — SIGNIFICANT CHANGE UP (ref 3.8–5.2)
RBC # FLD: 12.5 % — SIGNIFICANT CHANGE UP (ref 10.3–14.5)
SODIUM SERPL-SCNC: 132 MMOL/L — LOW (ref 135–145)
TROPONIN I SERPL-MCNC: <0.015 NG/ML — SIGNIFICANT CHANGE UP (ref 0–0.04)
TROPONIN I SERPL-MCNC: <0.015 NG/ML — SIGNIFICANT CHANGE UP (ref 0–0.04)
WBC # BLD: 5.1 K/UL — SIGNIFICANT CHANGE UP (ref 3.8–10.5)
WBC # FLD AUTO: 5.1 K/UL — SIGNIFICANT CHANGE UP (ref 3.8–10.5)

## 2017-08-25 PROCEDURE — 85027 COMPLETE CBC AUTOMATED: CPT

## 2017-08-25 PROCEDURE — 80053 COMPREHEN METABOLIC PANEL: CPT

## 2017-08-25 PROCEDURE — 93005 ELECTROCARDIOGRAM TRACING: CPT

## 2017-08-25 PROCEDURE — 99285 EMERGENCY DEPT VISIT HI MDM: CPT

## 2017-08-25 PROCEDURE — 70450 CT HEAD/BRAIN W/O DYE: CPT

## 2017-08-25 PROCEDURE — 99284 EMERGENCY DEPT VISIT MOD MDM: CPT | Mod: 25

## 2017-08-25 PROCEDURE — 71046 X-RAY EXAM CHEST 2 VIEWS: CPT

## 2017-08-25 PROCEDURE — 96374 THER/PROPH/DIAG INJ IV PUSH: CPT

## 2017-08-25 PROCEDURE — 84484 ASSAY OF TROPONIN QUANT: CPT

## 2017-08-25 PROCEDURE — 71020: CPT | Mod: 26

## 2017-08-25 PROCEDURE — 83880 ASSAY OF NATRIURETIC PEPTIDE: CPT

## 2017-08-25 PROCEDURE — 70450 CT HEAD/BRAIN W/O DYE: CPT | Mod: 26

## 2017-08-25 RX ORDER — ASPIRIN/CALCIUM CARB/MAGNESIUM 324 MG
325 TABLET ORAL ONCE
Qty: 0 | Refills: 0 | Status: COMPLETED | OUTPATIENT
Start: 2017-08-25 | End: 2017-08-25

## 2017-08-25 RX ORDER — HYDRALAZINE HCL 50 MG
20 TABLET ORAL ONCE
Qty: 0 | Refills: 0 | Status: COMPLETED | OUTPATIENT
Start: 2017-08-25 | End: 2017-08-25

## 2017-08-25 RX ORDER — SODIUM CHLORIDE 9 MG/ML
3 INJECTION INTRAMUSCULAR; INTRAVENOUS; SUBCUTANEOUS ONCE
Qty: 0 | Refills: 0 | Status: COMPLETED | OUTPATIENT
Start: 2017-08-25 | End: 2017-08-25

## 2017-08-25 RX ADMIN — Medication 20 MILLIGRAM(S): at 17:03

## 2017-08-25 RX ADMIN — SODIUM CHLORIDE 3 MILLILITER(S): 9 INJECTION INTRAMUSCULAR; INTRAVENOUS; SUBCUTANEOUS at 17:03

## 2017-08-25 RX ADMIN — Medication 325 MILLIGRAM(S): at 17:03

## 2017-08-25 NOTE — ED PROVIDER NOTE - MEDICAL DECISION MAKING DETAILS
sx resolved once htn resolved. labs reassuring, ct head reassuring. cxr shows right sided opacity which was present in feb 2017. possible mass. discussed findings with daughter Marina [phone] and son Michael [phone]. Pt needs CT scan of chest to further characterize. Discussed that it should be done within a month. PMD appointment being arranged by navigator (from ER) as well as pt's daughter Marina. PMD Dr. Rebolledo and Dr. Johnson.

## 2017-08-25 NOTE — ED PROVIDER NOTE - OBJECTIVE STATEMENT
86 y/o F pt w/ PMHx of HTN and Hep C presents to ED c/o dizziness (room spinning) and HTN since 12PM today. Pt reports no recent steroid use or infection. Pt is on 5 different medications for blood pressure; pt is always having issues controlling her blood pressure. Pt denies fever, chills, nausea, vomiting, chest pain, or any other complaints. Pt had recent carotid doppler that was normal. Pt is allergic to multiple drugs as listed. 86 y/o F pt w/ PMHx of HTN and Hep C presents to ED c/o dizziness (room spinning) and HTN since 6 am when pt woke up today. Pt reports no recent steroid use or infection. Pt is on 5 different medications for blood pressure; pt is always having issues controlling her blood pressure. Pt denies fever, chills, nausea, vomiting, chest pain, or any other complaints. Pt had recent carotid doppler that was normal. Pt is allergic to multiple drugs as listed.

## 2017-08-25 NOTE — ED PROVIDER NOTE - CHPI ED SYMPTOMS NEG
no chest pain/no nausea/no fever/no vomiting/no chills no chills/no nausea/no diaphoresis/no fever/no vomiting/no chest pain

## 2018-01-04 ENCOUNTER — EMERGENCY (EMERGENCY)
Facility: HOSPITAL | Age: 83
LOS: 1 days | Discharge: HOME | End: 2018-01-04
Attending: EMERGENCY MEDICINE
Payer: MEDICARE

## 2018-01-04 VITALS
TEMPERATURE: 98 F | OXYGEN SATURATION: 97 % | HEIGHT: 62 IN | RESPIRATION RATE: 20 BRPM | DIASTOLIC BLOOD PRESSURE: 74 MMHG | SYSTOLIC BLOOD PRESSURE: 190 MMHG | HEART RATE: 60 BPM | WEIGHT: 141.1 LBS

## 2018-01-04 VITALS
DIASTOLIC BLOOD PRESSURE: 56 MMHG | SYSTOLIC BLOOD PRESSURE: 126 MMHG | TEMPERATURE: 99 F | RESPIRATION RATE: 16 BRPM | OXYGEN SATURATION: 98 % | HEART RATE: 62 BPM

## 2018-01-04 DIAGNOSIS — Z90.710 ACQUIRED ABSENCE OF BOTH CERVIX AND UTERUS: Chronic | ICD-10-CM

## 2018-01-04 DIAGNOSIS — Z96.642 PRESENCE OF LEFT ARTIFICIAL HIP JOINT: Chronic | ICD-10-CM

## 2018-01-04 LAB
ALBUMIN SERPL ELPH-MCNC: 3.9 G/DL — SIGNIFICANT CHANGE UP (ref 3.5–5)
ALP SERPL-CCNC: 61 U/L — SIGNIFICANT CHANGE UP (ref 40–120)
ALT FLD-CCNC: 35 U/L DA — SIGNIFICANT CHANGE UP (ref 10–60)
ANION GAP SERPL CALC-SCNC: 11 MMOL/L — SIGNIFICANT CHANGE UP (ref 5–17)
AST SERPL-CCNC: 23 U/L — SIGNIFICANT CHANGE UP (ref 10–40)
BASOPHILS # BLD AUTO: 0 K/UL — SIGNIFICANT CHANGE UP (ref 0–0.2)
BASOPHILS NFR BLD AUTO: 0.6 % — SIGNIFICANT CHANGE UP (ref 0–2)
BILIRUB SERPL-MCNC: 0.5 MG/DL — SIGNIFICANT CHANGE UP (ref 0.2–1.2)
BUN SERPL-MCNC: 17 MG/DL — SIGNIFICANT CHANGE UP (ref 7–18)
CALCIUM SERPL-MCNC: 9.3 MG/DL — SIGNIFICANT CHANGE UP (ref 8.4–10.5)
CHLORIDE SERPL-SCNC: 89 MMOL/L — LOW (ref 96–108)
CO2 SERPL-SCNC: 26 MMOL/L — SIGNIFICANT CHANGE UP (ref 22–31)
CREAT SERPL-MCNC: 0.71 MG/DL — SIGNIFICANT CHANGE UP (ref 0.5–1.3)
EOSINOPHIL # BLD AUTO: 0 K/UL — SIGNIFICANT CHANGE UP (ref 0–0.5)
EOSINOPHIL NFR BLD AUTO: 0.4 % — SIGNIFICANT CHANGE UP (ref 0–6)
GLUCOSE SERPL-MCNC: 137 MG/DL — HIGH (ref 70–99)
HCT VFR BLD CALC: 40.2 % — SIGNIFICANT CHANGE UP (ref 34.5–45)
HGB BLD-MCNC: 12.8 G/DL — SIGNIFICANT CHANGE UP (ref 11.5–15.5)
LYMPHOCYTES # BLD AUTO: 1.4 K/UL — SIGNIFICANT CHANGE UP (ref 1–3.3)
LYMPHOCYTES # BLD AUTO: 19.8 % — SIGNIFICANT CHANGE UP (ref 13–44)
MCHC RBC-ENTMCNC: 27.7 PG — SIGNIFICANT CHANGE UP (ref 27–34)
MCHC RBC-ENTMCNC: 31.8 GM/DL — LOW (ref 32–36)
MCV RBC AUTO: 87.2 FL — SIGNIFICANT CHANGE UP (ref 80–100)
MONOCYTES # BLD AUTO: 0.2 K/UL — SIGNIFICANT CHANGE UP (ref 0–0.9)
MONOCYTES NFR BLD AUTO: 3.6 % — SIGNIFICANT CHANGE UP (ref 2–14)
NEUTROPHILS # BLD AUTO: 5.2 K/UL — SIGNIFICANT CHANGE UP (ref 1.8–7.4)
NEUTROPHILS NFR BLD AUTO: 75.7 % — SIGNIFICANT CHANGE UP (ref 43–77)
PLATELET # BLD AUTO: 172 K/UL — SIGNIFICANT CHANGE UP (ref 150–400)
POTASSIUM SERPL-MCNC: 3.3 MMOL/L — LOW (ref 3.5–5.3)
POTASSIUM SERPL-SCNC: 3.3 MMOL/L — LOW (ref 3.5–5.3)
PROT SERPL-MCNC: 8.9 G/DL — HIGH (ref 6–8.3)
RBC # BLD: 4.62 M/UL — SIGNIFICANT CHANGE UP (ref 3.8–5.2)
RBC # FLD: 12.2 % — SIGNIFICANT CHANGE UP (ref 10.3–14.5)
SODIUM SERPL-SCNC: 126 MMOL/L — LOW (ref 135–145)
WBC # BLD: 6.9 K/UL — SIGNIFICANT CHANGE UP (ref 3.8–10.5)
WBC # FLD AUTO: 6.9 K/UL — SIGNIFICANT CHANGE UP (ref 3.8–10.5)

## 2018-01-04 PROCEDURE — 96374 THER/PROPH/DIAG INJ IV PUSH: CPT

## 2018-01-04 PROCEDURE — 99284 EMERGENCY DEPT VISIT MOD MDM: CPT | Mod: 25

## 2018-01-04 PROCEDURE — 80053 COMPREHEN METABOLIC PANEL: CPT

## 2018-01-04 PROCEDURE — 96375 TX/PRO/DX INJ NEW DRUG ADDON: CPT

## 2018-01-04 PROCEDURE — 93005 ELECTROCARDIOGRAM TRACING: CPT

## 2018-01-04 PROCEDURE — 85027 COMPLETE CBC AUTOMATED: CPT

## 2018-01-04 PROCEDURE — 99284 EMERGENCY DEPT VISIT MOD MDM: CPT

## 2018-01-04 PROCEDURE — 93971 EXTREMITY STUDY: CPT

## 2018-01-04 PROCEDURE — 93971 EXTREMITY STUDY: CPT | Mod: 26,LT

## 2018-01-04 RX ORDER — SODIUM CHLORIDE 9 MG/ML
1000 INJECTION INTRAMUSCULAR; INTRAVENOUS; SUBCUTANEOUS ONCE
Qty: 0 | Refills: 0 | Status: COMPLETED | OUTPATIENT
Start: 2018-01-04 | End: 2018-01-04

## 2018-01-04 RX ORDER — ONDANSETRON 8 MG/1
4 TABLET, FILM COATED ORAL ONCE
Qty: 0 | Refills: 0 | Status: COMPLETED | OUTPATIENT
Start: 2018-01-04 | End: 2018-01-04

## 2018-01-04 RX ORDER — IBUPROFEN 200 MG
200 TABLET ORAL ONCE
Qty: 0 | Refills: 0 | Status: COMPLETED | OUTPATIENT
Start: 2018-01-04 | End: 2018-01-04

## 2018-01-04 RX ORDER — HYDRALAZINE HCL 50 MG
10 TABLET ORAL ONCE
Qty: 0 | Refills: 0 | Status: COMPLETED | OUTPATIENT
Start: 2018-01-04 | End: 2018-01-04

## 2018-01-04 RX ADMIN — SODIUM CHLORIDE 1000 MILLILITER(S): 9 INJECTION INTRAMUSCULAR; INTRAVENOUS; SUBCUTANEOUS at 21:09

## 2018-01-04 RX ADMIN — ONDANSETRON 4 MILLIGRAM(S): 8 TABLET, FILM COATED ORAL at 17:59

## 2018-01-04 RX ADMIN — Medication 200 MILLIGRAM(S): at 21:10

## 2018-01-04 RX ADMIN — Medication 10 MILLIGRAM(S): at 17:59

## 2018-01-04 NOTE — ED PROVIDER NOTE - MUSCULOSKELETAL, MLM
Spine appears normal, range of motion is not limited, no muscle or joint tenderness Spine appears normal, range of motion is not limited, no muscle or joint tenderness, no edema, no calf tenderness, DP and PT pulses present.

## 2018-01-04 NOTE — ED PROVIDER NOTE - OBJECTIVE STATEMENT
86 y/o F pt w/ PMHx of HTN, Hepatitis C, presents to ED c/o elevated BP (218 systolic PTA) x today. Pt also reports nausea and vomiting. Pt flied back from [place] this morning and took her first dose of blood pressure medication at around 11am. Pt took her second dose, but then vomited. Pt then had stool softening medication, which worsened the nausea and vomiting (liquid). Pt was unable to take another dose of her blood pressure medication because of her nausea. Pt denies any chest pain, leg swelling, fever, chills, or any other complaints. ALLERGIES: penicillin (rash), Zantac (unknown), famotidine (unknown), Tylenol (unknown), Tagement (unknown). 86 y/o F pt w/ PMHx of HTN, Hepatitis C, presents to ED c/o elevated BP (218 systolic PTA) x today. Pt also reports nausea and vomiting. Pt flew back from [place] this morning and took her first dose of blood pressure medication at around 11am. Pt took her second dose, but then vomited. Pt then had stool softening medication, which worsened the nausea and vomiting (liquid). Pt was unable to take another dose of her blood pressure medication because of her nausea. Pt denies any chest pain, leg swelling, fever, chills, or any other complaints. ALLERGIES: penicillin (rash), Zantac (unknown), famotidine (unknown), Tylenol (unknown), Tagement (unknown). She had a recent cardiac catheterization that was normal.

## 2018-01-04 NOTE — ED PROVIDER NOTE - CONDUCTED A DETAILED DISCUSSION WITH PATIENT AND/OR GUARDIAN REGARDING, MDM
need for outpatient follow-up need for outpatient follow-up/return to ED if symptoms worsen, persist or questions arise/lab results/radiology results

## 2018-01-04 NOTE — ED PROVIDER NOTE - PROGRESS NOTE DETAILS
Spoke with prabhu who is a doctor. Feels like the low sodium is from diuretic, will give NS. Pt now reports cramping in the left leg, no edema, no calf tenderness, will sono as recent flight. Pt is tolerating po, had cheese sandwich from home and several cups of water, ambulatory with her cane. BP improved. Will dc to follow up with pmd. Precautions reviewed. Pt and daughter would like to be discharged.

## 2018-02-09 ENCOUNTER — EMERGENCY (EMERGENCY)
Facility: HOSPITAL | Age: 83
LOS: 1 days | Discharge: HOME | End: 2018-02-09
Attending: EMERGENCY MEDICINE
Payer: MEDICARE

## 2018-02-09 VITALS
DIASTOLIC BLOOD PRESSURE: 60 MMHG | SYSTOLIC BLOOD PRESSURE: 151 MMHG | OXYGEN SATURATION: 96 % | HEART RATE: 74 BPM | TEMPERATURE: 98 F | RESPIRATION RATE: 16 BRPM

## 2018-02-09 VITALS
SYSTOLIC BLOOD PRESSURE: 155 MMHG | TEMPERATURE: 98 F | RESPIRATION RATE: 16 BRPM | OXYGEN SATURATION: 99 % | HEART RATE: 95 BPM | HEIGHT: 65 IN | DIASTOLIC BLOOD PRESSURE: 76 MMHG | WEIGHT: 139.99 LBS

## 2018-02-09 DIAGNOSIS — Z90.710 ACQUIRED ABSENCE OF BOTH CERVIX AND UTERUS: Chronic | ICD-10-CM

## 2018-02-09 DIAGNOSIS — Z96.642 PRESENCE OF LEFT ARTIFICIAL HIP JOINT: Chronic | ICD-10-CM

## 2018-02-09 PROCEDURE — 73562 X-RAY EXAM OF KNEE 3: CPT

## 2018-02-09 PROCEDURE — 99284 EMERGENCY DEPT VISIT MOD MDM: CPT | Mod: 25

## 2018-02-09 PROCEDURE — 73562 X-RAY EXAM OF KNEE 3: CPT | Mod: 26,50

## 2018-02-09 PROCEDURE — 70450 CT HEAD/BRAIN W/O DYE: CPT

## 2018-02-09 PROCEDURE — 70450 CT HEAD/BRAIN W/O DYE: CPT | Mod: 26

## 2018-02-09 NOTE — ED PROVIDER NOTE - MEDICAL DECISION MAKING DETAILS
ct head , xray  pt adamently declines admission for further work up for multiple falls.  pt will follow up with pmd.  discussed risk benefits  pt with capacity.  family at bedside and can take patient home

## 2018-05-03 ENCOUNTER — INPATIENT (INPATIENT)
Facility: HOSPITAL | Age: 83
LOS: 3 days | Discharge: HOME | DRG: 552 | End: 2018-05-07
Attending: INTERNAL MEDICINE | Admitting: INTERNAL MEDICINE
Payer: MEDICARE

## 2018-05-03 VITALS
DIASTOLIC BLOOD PRESSURE: 80 MMHG | SYSTOLIC BLOOD PRESSURE: 187 MMHG | TEMPERATURE: 98 F | HEART RATE: 60 BPM | OXYGEN SATURATION: 96 % | RESPIRATION RATE: 18 BRPM

## 2018-05-03 DIAGNOSIS — I10 ESSENTIAL (PRIMARY) HYPERTENSION: ICD-10-CM
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
## 2018-05-03 DIAGNOSIS — Z96.642 PRESENCE OF LEFT ARTIFICIAL HIP JOINT: Chronic | ICD-10-CM

## 2018-05-03 DIAGNOSIS — E03.9 HYPOTHYROIDISM, UNSPECIFIED: ICD-10-CM

## 2018-05-03 DIAGNOSIS — M80.88XA OTHER OSTEOPOROSIS WITH CURRENT PATHOLOGICAL FRACTURE, VERTEBRA(E), INITIAL ENCOUNTER FOR FRACTURE: ICD-10-CM

## 2018-05-03 DIAGNOSIS — Z90.710 ACQUIRED ABSENCE OF BOTH CERVIX AND UTERUS: Chronic | ICD-10-CM

## 2018-05-03 DIAGNOSIS — Z29.9 ENCOUNTER FOR PROPHYLACTIC MEASURES, UNSPECIFIED: ICD-10-CM

## 2018-05-03 LAB
ANION GAP SERPL CALC-SCNC: 6 MMOL/L — SIGNIFICANT CHANGE UP (ref 5–17)
BASOPHILS # BLD AUTO: 0 K/UL — SIGNIFICANT CHANGE UP (ref 0–0.2)
BASOPHILS NFR BLD AUTO: 0.3 % — SIGNIFICANT CHANGE UP (ref 0–2)
BUN SERPL-MCNC: 14 MG/DL — SIGNIFICANT CHANGE UP (ref 7–18)
CALCIUM SERPL-MCNC: 9 MG/DL — SIGNIFICANT CHANGE UP (ref 8.4–10.5)
CHLORIDE SERPL-SCNC: 102 MMOL/L — SIGNIFICANT CHANGE UP (ref 96–108)
CK MB BLD-MCNC: <1.7 % — SIGNIFICANT CHANGE UP (ref 0–3.5)
CK MB CFR SERPL CALC: <1 NG/ML — SIGNIFICANT CHANGE UP (ref 0–3.6)
CK SERPL-CCNC: 60 U/L — SIGNIFICANT CHANGE UP (ref 21–215)
CO2 SERPL-SCNC: 26 MMOL/L — SIGNIFICANT CHANGE UP (ref 22–31)
CREAT SERPL-MCNC: 0.78 MG/DL — SIGNIFICANT CHANGE UP (ref 0.5–1.3)
EOSINOPHIL # BLD AUTO: 0 K/UL — SIGNIFICANT CHANGE UP (ref 0–0.5)
EOSINOPHIL NFR BLD AUTO: 0.3 % — SIGNIFICANT CHANGE UP (ref 0–6)
GLUCOSE BLDC GLUCOMTR-MCNC: 117 MG/DL — HIGH (ref 70–99)
GLUCOSE SERPL-MCNC: 163 MG/DL — HIGH (ref 70–99)
HCT VFR BLD CALC: 42.8 % — SIGNIFICANT CHANGE UP (ref 34.5–45)
HGB BLD-MCNC: 13.2 G/DL — SIGNIFICANT CHANGE UP (ref 11.5–15.5)
LYMPHOCYTES # BLD AUTO: 1.5 K/UL — SIGNIFICANT CHANGE UP (ref 1–3.3)
LYMPHOCYTES # BLD AUTO: 14.5 % — SIGNIFICANT CHANGE UP (ref 13–44)
MCHC RBC-ENTMCNC: 26.4 PG — LOW (ref 27–34)
MCHC RBC-ENTMCNC: 30.9 GM/DL — LOW (ref 32–36)
MCV RBC AUTO: 85.4 FL — SIGNIFICANT CHANGE UP (ref 80–100)
MONOCYTES # BLD AUTO: 0.3 K/UL — SIGNIFICANT CHANGE UP (ref 0–0.9)
MONOCYTES NFR BLD AUTO: 3.4 % — SIGNIFICANT CHANGE UP (ref 2–14)
NEUTROPHILS # BLD AUTO: 8.2 K/UL — HIGH (ref 1.8–7.4)
NEUTROPHILS NFR BLD AUTO: 81.4 % — HIGH (ref 43–77)
PLATELET # BLD AUTO: 168 K/UL — SIGNIFICANT CHANGE UP (ref 150–400)
POTASSIUM SERPL-MCNC: 3.8 MMOL/L — SIGNIFICANT CHANGE UP (ref 3.5–5.3)
POTASSIUM SERPL-SCNC: 3.8 MMOL/L — SIGNIFICANT CHANGE UP (ref 3.5–5.3)
RBC # BLD: 5.02 M/UL — SIGNIFICANT CHANGE UP (ref 3.8–5.2)
RBC # FLD: 12.4 % — SIGNIFICANT CHANGE UP (ref 10.3–14.5)
SODIUM SERPL-SCNC: 134 MMOL/L — LOW (ref 135–145)
TROPONIN I SERPL-MCNC: <0.015 NG/ML — SIGNIFICANT CHANGE UP (ref 0–0.04)
WBC # BLD: 10.1 K/UL — SIGNIFICANT CHANGE UP (ref 3.8–10.5)
WBC # FLD AUTO: 10.1 K/UL — SIGNIFICANT CHANGE UP (ref 3.8–10.5)

## 2018-05-03 PROCEDURE — 73030 X-RAY EXAM OF SHOULDER: CPT | Mod: 26,RT

## 2018-05-03 PROCEDURE — 72131 CT LUMBAR SPINE W/O DYE: CPT | Mod: 26

## 2018-05-03 PROCEDURE — 99285 EMERGENCY DEPT VISIT HI MDM: CPT

## 2018-05-03 PROCEDURE — 99223 1ST HOSP IP/OBS HIGH 75: CPT | Mod: AI,GC

## 2018-05-03 PROCEDURE — 70450 CT HEAD/BRAIN W/O DYE: CPT | Mod: 26

## 2018-05-03 RX ORDER — AMLODIPINE BESYLATE 2.5 MG/1
1 TABLET ORAL
Qty: 0 | Refills: 0 | COMMUNITY

## 2018-05-03 RX ORDER — LIPASE/PROTEASE/AMYLASE 16-48-48K
0 CAPSULE,DELAYED RELEASE (ENTERIC COATED) ORAL
Qty: 0 | Refills: 0 | COMMUNITY

## 2018-05-03 RX ORDER — IBANDRONATE SODIUM 150 MG/1
1 TABLET ORAL
Qty: 0 | Refills: 0 | COMMUNITY

## 2018-05-03 RX ORDER — LEVOTHYROXINE SODIUM 125 MCG
100 TABLET ORAL DAILY
Qty: 0 | Refills: 0 | Status: DISCONTINUED | OUTPATIENT
Start: 2018-05-03 | End: 2018-05-07

## 2018-05-03 RX ORDER — ENOXAPARIN SODIUM 100 MG/ML
40 INJECTION SUBCUTANEOUS DAILY
Qty: 0 | Refills: 0 | Status: DISCONTINUED | OUTPATIENT
Start: 2018-05-03 | End: 2018-05-07

## 2018-05-03 RX ORDER — MECLIZINE HCL 12.5 MG
12.5 TABLET ORAL
Qty: 0 | Refills: 0 | Status: DISCONTINUED | OUTPATIENT
Start: 2018-05-03 | End: 2018-05-07

## 2018-05-03 RX ORDER — LOSARTAN POTASSIUM 100 MG/1
25 TABLET, FILM COATED ORAL DAILY
Qty: 0 | Refills: 0 | Status: DISCONTINUED | OUTPATIENT
Start: 2018-05-03 | End: 2018-05-04

## 2018-05-03 RX ORDER — INSULIN LISPRO 100/ML
VIAL (ML) SUBCUTANEOUS
Qty: 0 | Refills: 0 | Status: DISCONTINUED | OUTPATIENT
Start: 2018-05-03 | End: 2018-05-07

## 2018-05-03 RX ORDER — MORPHINE SULFATE 50 MG/1
2 CAPSULE, EXTENDED RELEASE ORAL ONCE
Qty: 0 | Refills: 0 | Status: DISCONTINUED | OUTPATIENT
Start: 2018-05-03 | End: 2018-05-03

## 2018-05-03 RX ORDER — METOPROLOL TARTRATE 50 MG
50 TABLET ORAL DAILY
Qty: 0 | Refills: 0 | Status: DISCONTINUED | OUTPATIENT
Start: 2018-05-04 | End: 2018-05-07

## 2018-05-03 RX ORDER — TRAMADOL HYDROCHLORIDE 50 MG/1
25 TABLET ORAL EVERY 6 HOURS
Qty: 0 | Refills: 0 | Status: DISCONTINUED | OUTPATIENT
Start: 2018-05-03 | End: 2018-05-07

## 2018-05-03 RX ORDER — ESCITALOPRAM OXALATE 10 MG/1
5 TABLET, FILM COATED ORAL DAILY
Qty: 0 | Refills: 0 | Status: DISCONTINUED | OUTPATIENT
Start: 2018-05-03 | End: 2018-05-07

## 2018-05-03 RX ORDER — CHOLECALCIFEROL (VITAMIN D3) 125 MCG
0 CAPSULE ORAL
Qty: 0 | Refills: 0 | COMMUNITY

## 2018-05-03 RX ORDER — HYDRALAZINE HCL 50 MG
50 TABLET ORAL THREE TIMES A DAY
Qty: 0 | Refills: 0 | Status: DISCONTINUED | OUTPATIENT
Start: 2018-05-03 | End: 2018-05-04

## 2018-05-03 RX ADMIN — MORPHINE SULFATE 2 MILLIGRAM(S): 50 CAPSULE, EXTENDED RELEASE ORAL at 12:19

## 2018-05-03 RX ADMIN — ESCITALOPRAM OXALATE 5 MILLIGRAM(S): 10 TABLET, FILM COATED ORAL at 23:54

## 2018-05-03 RX ADMIN — Medication 50 MILLIGRAM(S): at 23:54

## 2018-05-03 RX ADMIN — TRAMADOL HYDROCHLORIDE 25 MILLIGRAM(S): 50 TABLET ORAL at 20:15

## 2018-05-03 RX ADMIN — MORPHINE SULFATE 2 MILLIGRAM(S): 50 CAPSULE, EXTENDED RELEASE ORAL at 12:40

## 2018-05-03 RX ADMIN — TRAMADOL HYDROCHLORIDE 25 MILLIGRAM(S): 50 TABLET ORAL at 18:26

## 2018-05-03 NOTE — H&P ADULT - NEUROLOGICAL DETAILS
cranial nerves intact/responds to pain/sensation intact/alert and oriented x 3/responds to verbal commands

## 2018-05-03 NOTE — H&P ADULT - ASSESSMENT
85yo [place] Korean-speaking female from home, walks with walker, HHA 9 hours Sun-Fri, PMH HTN, depression, Hypothyroidism, chronic dizziness (on meclizine), h/o Hepatitis C (treated in 2016), hypothyroidism and PSH of hysterectomy and L hip replacement presented with mechanical fall in

## 2018-05-03 NOTE — H&P ADULT - PROBLEM SELECTOR PLAN 1
Patient presented with mechanical fall without LOC. Denies chest pain, SOB or palpitations.  Fall likely secondary to degenerative joint disease.   CT Lumbar  An acute compression fracture of L2 is noted. Also noted is a chronic   compression deformity of T12.   Pain control with Celecoxib (patient don't want to use tylenol)  PT Consult. Ortho consulted. Patient presented with mechanical fall without LOC. Denies chest pain, SOB or palpitations.  Fall likely secondary to degenerative joint disease.   CT Lumbar  An acute compression fracture of L2 is noted. Also noted is a chronic   compression deformity of T12.   CT head: no acute event. Generalized volume loss and involutional change, stable in   appearance.   Pain control with Tramadol (patient don't want to use tylenol)  PT Consulted. Ortho consulted Dr Rodriguez

## 2018-05-03 NOTE — H&P ADULT - NSHPLABSRESULTS_GEN_ALL_CORE
Vital Signs Last 24 Hrs  T(C): 36.6 (03 May 2018 16:48), Max: 36.7 (03 May 2018 08:43)  T(F): 97.9 (03 May 2018 16:48), Max: 98 (03 May 2018 08:43)  HR: 64 (03 May 2018 16:48) (60 - 64)  BP: 168/56 (03 May 2018 16:48) (168/56 - 187/80)  BP(mean): --  RR: 18 (03 May 2018 16:48) (18 - 18)  SpO2: 93% (03 May 2018 16:48) (93% - 96%)

## 2018-05-03 NOTE — ED ADULT NURSE NOTE - ED STAT RN HANDOFF DETAILS
Pt admitted to ed awaiting for bed availability  transferred to [facility] endorsed to Nirmala BARROSO

## 2018-05-03 NOTE — ED ADULT NURSE NOTE - OBJECTIVE STATEMENT
BIB EMS from home with c/o Rt shoulder pain headache and back pain S/P fall this morning Pt reports difficulty in walking and generalized weakness denies dizziness C/P as per son report Pt has frequent fall BIB EMS from home with c/o Rt shoulder pain headache and back pain S/P fall this morning Pt reports difficulty in walking and generalized weakness denies dizziness C/P as per son report Pt has frequent fall.

## 2018-05-03 NOTE — H&P ADULT - PROBLEM SELECTOR PLAN 4
IMPROVE VTE score: 2, Lovenox for DVT  [ ] Previous VTE                                                3  [ ] Thrombophilia                                             2  [ ] Lower limb paralysis                                  2        (unable to hold up >15 seconds)    [ ] Current Cancer (within 6 months)            2   [x] Immobilization > 24 hrs                              1  [ ] ICU/CCU stay > 24 hours                            1  [x] Age > 60                                                         1

## 2018-05-03 NOTE — ED PROVIDER NOTE - PROGRESS NOTE DETAILS
spoke with patients PMD - Dr. Jolie Espana [phone].  Pt had normal cardiac cath 2 months ago.  pt had fall 2 weeks ago, and another last month.  Recommends admission for frequent fall.  Family initially reluctant, however now willing to stay. CT shows compression fracture (acute).  Labs unremarkable.  Given history of frequent falls, pt to be admitted.

## 2018-05-03 NOTE — H&P ADULT - ATTENDING COMMENTS
Patient seen and examined; Agreed with PGY 2 MAR A/P above with editing as needed.     85yo [place]ruguayan-speaking female from home, walks with walker, HHA 9 hours Sun-Fri, PMH HTN, depression, Hypothyroidism, chronic dizziness (on meclizine), h/o Hepatitis C (treated in 2016), hypothyroidism and PSH of hysterectomy and L hip replacement presented with mechanical fall in AM. Patient was trying to get out of bed and use the commode and fell down hitting her back. Denies loss of consciousness, dizziness, palpitations and chest pain.    Patient is doing okay at this time. Has pain in the back. BP elevated. As per sybil at bedside patient lives alone has HHA only 9 hrs a day may need more hours due to recurrent falls.    FH/SH/ ROS: As above     Vital Signs Last 24 Hrs  T(C): 36.6 (03 May 2018 16:48), Max: 36.7 (03 May 2018 08:43)  T(F): 97.9 (03 May 2018 16:48), Max: 98 (03 May 2018 08:43)  HR: 64 (03 May 2018 17:53) (60 - 64)  BP: 170/61 (03 May 2018 17:53) (168/56 - 187/80)  RR: 18 (03 May 2018 16:48) (18 - 18)  SpO2: 93% (03 May 2018 16:48) (93% - 96%)    P/E: As above; Agree with MAR exam    labs: reviewed; as above    < from: CT Lumbar Spine No Cont (05.03.18 @ 09:37) >    IMPRESSION:  1. An acute compression fracture of L2 is noted. Also noted is a chronic compression deformity of T12. See above.  2. Disc bulges and degenerative changes in the mid and lower lumbar region.    D/D;  Lumbar radiculopathy due to L2 compression fracture s/p Fall  Acute on Chronic pain  Uncontrolled HTN due to pain and missing home meds  Hypothyroid    Plan:  medicine; pain control with Tramadol prn for moderate pain and Morphine for severe pain  patient unlikely to be a candidate for   Ortho spine eval Dr. Eid  PT eval in AM after Ortho eval d/w JOEY Ramos  Resume home meds including anti HTN meds  patient will benefit from increased HHA as patient likely needs person  assistance with walker to ambulate     Discussed with daughter   Discussed with MATTEO Carver

## 2018-05-03 NOTE — ED PROVIDER NOTE - OBJECTIVE STATEMENT
88 y/o F pt w/ significant PMHx of HTN, Hep C, frequent falls and significant PSHx of abdominal hysterectomy and L hip replacement presents to ED w/ son who is being used for translation following a mechanical fall. Pt states that she was getting up to go to the commode next to her bed and had a mechanical slip and fall from height. Pt reports that she is currently c/o HA, L shoulder pain, and lower back pain. Pt denies LOC or any other complaints. Allergies: Penicillin (rash), Zantac, Famotidine, Tylenol, Tagamet.

## 2018-05-03 NOTE — H&P ADULT - PROBLEM SELECTOR PLAN 2
Patient is on Edarbi, Toprol XL and hydralazine at home.   BP is running high but patient did not got morning medications.   Continue home regimen and follow BP trends

## 2018-05-03 NOTE — ED PROVIDER NOTE - MEDICAL DECISION MAKING DETAILS
88 y/o F pt s/p mechanical fall. Admission discussed 2/2 frequent fall. However, pt has home physical therapy and nurse services. Declining lab work and admission. Will check CT Head, X-rays, and reassess.

## 2018-05-04 DIAGNOSIS — S32.020A WEDGE COMPRESSION FRACTURE OF SECOND LUMBAR VERTEBRA, INITIAL ENCOUNTER FOR CLOSED FRACTURE: ICD-10-CM

## 2018-05-04 DIAGNOSIS — R91.8 OTHER NONSPECIFIC ABNORMAL FINDING OF LUNG FIELD: ICD-10-CM

## 2018-05-04 DIAGNOSIS — M54.41 LUMBAGO WITH SCIATICA, RIGHT SIDE: ICD-10-CM

## 2018-05-04 LAB
24R-OH-CALCIDIOL SERPL-MCNC: 43.8 NG/ML — SIGNIFICANT CHANGE UP (ref 30–80)
ANION GAP SERPL CALC-SCNC: 9 MMOL/L — SIGNIFICANT CHANGE UP (ref 5–17)
BUN SERPL-MCNC: 15 MG/DL — SIGNIFICANT CHANGE UP (ref 7–18)
CALCIUM SERPL-MCNC: 8.9 MG/DL — SIGNIFICANT CHANGE UP (ref 8.4–10.5)
CHLORIDE SERPL-SCNC: 98 MMOL/L — SIGNIFICANT CHANGE UP (ref 96–108)
CHOLEST SERPL-MCNC: 122 MG/DL — SIGNIFICANT CHANGE UP (ref 10–199)
CO2 SERPL-SCNC: 25 MMOL/L — SIGNIFICANT CHANGE UP (ref 22–31)
CREAT SERPL-MCNC: 0.74 MG/DL — SIGNIFICANT CHANGE UP (ref 0.5–1.3)
GLUCOSE BLDC GLUCOMTR-MCNC: 118 MG/DL — HIGH (ref 70–99)
GLUCOSE BLDC GLUCOMTR-MCNC: 138 MG/DL — HIGH (ref 70–99)
GLUCOSE BLDC GLUCOMTR-MCNC: 147 MG/DL — HIGH (ref 70–99)
GLUCOSE BLDC GLUCOMTR-MCNC: 153 MG/DL — HIGH (ref 70–99)
GLUCOSE SERPL-MCNC: 111 MG/DL — HIGH (ref 70–99)
HBA1C BLD-MCNC: 5.7 % — HIGH (ref 4–5.6)
HDLC SERPL-MCNC: 44 MG/DL — SIGNIFICANT CHANGE UP (ref 40–125)
LIPID PNL WITH DIRECT LDL SERPL: 65 MG/DL — SIGNIFICANT CHANGE UP
MAGNESIUM SERPL-MCNC: 2.2 MG/DL — SIGNIFICANT CHANGE UP (ref 1.6–2.6)
PHOSPHATE SERPL-MCNC: 3 MG/DL — SIGNIFICANT CHANGE UP (ref 2.5–4.5)
POTASSIUM SERPL-MCNC: 3.6 MMOL/L — SIGNIFICANT CHANGE UP (ref 3.5–5.3)
POTASSIUM SERPL-SCNC: 3.6 MMOL/L — SIGNIFICANT CHANGE UP (ref 3.5–5.3)
SODIUM SERPL-SCNC: 132 MMOL/L — LOW (ref 135–145)
TOTAL CHOLESTEROL/HDL RATIO MEASUREMENT: 2.8 RATIO — LOW (ref 3.3–7.1)
TRIGL SERPL-MCNC: 67 MG/DL — SIGNIFICANT CHANGE UP (ref 10–149)
TSH SERPL-MCNC: 5.16 UU/ML — HIGH (ref 0.34–4.82)
VIT B12 SERPL-MCNC: 936 PG/ML — SIGNIFICANT CHANGE UP (ref 232–1245)

## 2018-05-04 PROCEDURE — 99233 SBSQ HOSP IP/OBS HIGH 50: CPT | Mod: GC

## 2018-05-04 PROCEDURE — 71250 CT THORAX DX C-: CPT | Mod: 26

## 2018-05-04 PROCEDURE — 99223 1ST HOSP IP/OBS HIGH 75: CPT

## 2018-05-04 RX ORDER — LIDOCAINE 4 G/100G
1 CREAM TOPICAL DAILY
Qty: 0 | Refills: 0 | Status: DISCONTINUED | OUTPATIENT
Start: 2018-05-04 | End: 2018-05-07

## 2018-05-04 RX ORDER — SENNA PLUS 8.6 MG/1
2 TABLET ORAL AT BEDTIME
Qty: 0 | Refills: 0 | Status: DISCONTINUED | OUTPATIENT
Start: 2018-05-04 | End: 2018-05-07

## 2018-05-04 RX ORDER — DEXAMETHASONE 0.5 MG/5ML
4 ELIXIR ORAL EVERY 6 HOURS
Qty: 0 | Refills: 0 | Status: COMPLETED | OUTPATIENT
Start: 2018-05-04 | End: 2018-05-06

## 2018-05-04 RX ORDER — DOCUSATE SODIUM 100 MG
100 CAPSULE ORAL
Qty: 0 | Refills: 0 | Status: DISCONTINUED | OUTPATIENT
Start: 2018-05-04 | End: 2018-05-07

## 2018-05-04 RX ORDER — GABAPENTIN 400 MG/1
100 CAPSULE ORAL AT BEDTIME
Qty: 0 | Refills: 0 | Status: DISCONTINUED | OUTPATIENT
Start: 2018-05-04 | End: 2018-05-07

## 2018-05-04 RX ORDER — POLYETHYLENE GLYCOL 3350 17 G/17G
17 POWDER, FOR SOLUTION ORAL DAILY
Qty: 0 | Refills: 0 | Status: COMPLETED | OUTPATIENT
Start: 2018-05-04 | End: 2018-05-06

## 2018-05-04 RX ORDER — CYCLOBENZAPRINE HYDROCHLORIDE 10 MG/1
5 TABLET, FILM COATED ORAL THREE TIMES A DAY
Qty: 0 | Refills: 0 | Status: DISCONTINUED | OUTPATIENT
Start: 2018-05-04 | End: 2018-05-07

## 2018-05-04 RX ORDER — HYDRALAZINE HCL 50 MG
50 TABLET ORAL ONCE
Qty: 0 | Refills: 0 | Status: COMPLETED | OUTPATIENT
Start: 2018-05-04 | End: 2018-05-04

## 2018-05-04 RX ORDER — AMLODIPINE BESYLATE 2.5 MG/1
5 TABLET ORAL DAILY
Qty: 0 | Refills: 0 | Status: DISCONTINUED | OUTPATIENT
Start: 2018-05-04 | End: 2018-05-05

## 2018-05-04 RX ORDER — HYDRALAZINE HCL 50 MG
100 TABLET ORAL THREE TIMES A DAY
Qty: 0 | Refills: 0 | Status: DISCONTINUED | OUTPATIENT
Start: 2018-05-04 | End: 2018-05-07

## 2018-05-04 RX ORDER — LOSARTAN POTASSIUM 100 MG/1
100 TABLET, FILM COATED ORAL DAILY
Qty: 0 | Refills: 0 | Status: DISCONTINUED | OUTPATIENT
Start: 2018-05-04 | End: 2018-05-07

## 2018-05-04 RX ADMIN — AMLODIPINE BESYLATE 5 MILLIGRAM(S): 2.5 TABLET ORAL at 18:04

## 2018-05-04 RX ADMIN — TRAMADOL HYDROCHLORIDE 25 MILLIGRAM(S): 50 TABLET ORAL at 16:54

## 2018-05-04 RX ADMIN — POLYETHYLENE GLYCOL 3350 17 GRAM(S): 17 POWDER, FOR SOLUTION ORAL at 11:59

## 2018-05-04 RX ADMIN — Medication 100 MILLIGRAM(S): at 18:07

## 2018-05-04 RX ADMIN — Medication 100 MICROGRAM(S): at 06:49

## 2018-05-04 RX ADMIN — SENNA PLUS 2 TABLET(S): 8.6 TABLET ORAL at 22:08

## 2018-05-04 RX ADMIN — LIDOCAINE 1 PATCH: 4 CREAM TOPICAL at 18:04

## 2018-05-04 RX ADMIN — LOSARTAN POTASSIUM 100 MILLIGRAM(S): 100 TABLET, FILM COATED ORAL at 18:05

## 2018-05-04 RX ADMIN — Medication 4 MILLIGRAM(S): at 18:07

## 2018-05-04 RX ADMIN — Medication 50 MILLIGRAM(S): at 18:06

## 2018-05-04 RX ADMIN — Medication 4 MILLIGRAM(S): at 23:05

## 2018-05-04 RX ADMIN — Medication 50 MILLIGRAM(S): at 13:32

## 2018-05-04 RX ADMIN — ESCITALOPRAM OXALATE 5 MILLIGRAM(S): 10 TABLET, FILM COATED ORAL at 11:59

## 2018-05-04 RX ADMIN — Medication 50 MILLIGRAM(S): at 06:49

## 2018-05-04 RX ADMIN — TRAMADOL HYDROCHLORIDE 25 MILLIGRAM(S): 50 TABLET ORAL at 14:59

## 2018-05-04 RX ADMIN — GABAPENTIN 100 MILLIGRAM(S): 400 CAPSULE ORAL at 22:09

## 2018-05-04 RX ADMIN — LOSARTAN POTASSIUM 25 MILLIGRAM(S): 100 TABLET, FILM COATED ORAL at 06:49

## 2018-05-04 RX ADMIN — ENOXAPARIN SODIUM 40 MILLIGRAM(S): 100 INJECTION SUBCUTANEOUS at 12:00

## 2018-05-04 RX ADMIN — Medication 100 MILLIGRAM(S): at 22:09

## 2018-05-04 NOTE — CONSULT NOTE ADULT - ASSESSMENT
Impression:  RUL lobulated mass 5 x 2.3 cm.  1.5 AUGUST nodule  Abnormal CT chest    Plan  Pretest probability for lesion malignancy appears to be over 80% given size, age, and location.   Slow growth since 2017 suggest low doubling time however CXRs are very limited  Options for workup include- CT guided lung biopsy which would be least invasive to give a tissue sample, Fiberoptic Bronchoscopy, CT/PET, vs No further intervention.  I called the patient's granddaughter who is a family physician ([phone]) and discussed the options. She will discuss with the rest of the family but likely will not want aggressive interventions given the patient's advanced age, possibly at minimum a CT/PET scan to assess for possible metastatic disease for prognostication. Impression:  RUL lobulated mass 5 x 2.3 cm.  1.5 AUGUST nodule  Abnormal CT chest    Plan  Pretest probability for lesion malignancy appears to be over 80% given size, age, and location.   Slow growth since 2017 suggest low doubling time however CXRs are very limited  Options for workup include- CT guided lung biopsy which would be least invasive to give a tissue sample, Fiberoptic Bronchoscopy, CT/PET, vs No further intervention.  I called the patient's granddaughter who is a family physician ([phone]) and discussed the options. She will discuss with the rest of the family but likely will not want aggressive interventions given the patient's advanced age, possibly at minimum a CT/PET scan to assess for possible metastatic disease for prognostication.

## 2018-05-04 NOTE — PROGRESS NOTE ADULT - PROBLEM SELECTOR PLAN 1
-recurrent mechanical fall as per family   -Will need more hours of HHA , social work consult requested   -Awaiting Ortho eval and PT eval   -continue with tramadol for pain management

## 2018-05-04 NOTE — CONSULT NOTE ADULT - SUBJECTIVE AND OBJECTIVE BOX
Source: Patient, Chart, granddaughter    Chief Complaint:    HPI: 85yo Ghanaian-speaking female from home, walks with walker with a PMH sig for HTN, depression, Hypothyroidism, and Hepatitis C (treated in 2016), hypothyroidism admitted for a mechanical fall in AM. The patient has had a hx of chronic falls. She has occasional dyspnea on exertion with a recent negative cath in January. She is a lifelong nonsmoker. Upon imaging after a fall, she was noted to have a RUL opacity which was confirmed after a CT chest. She has no cough. No hemoptysis. No weightloss, weakness, or fatigue. No prior hx of cancer.      MEDICATIONS  (STANDING):  enoxaparin Injectable 40 milliGRAM(s) SubCutaneous daily  escitalopram 5 milliGRAM(s) Oral daily  hydrALAZINE 50 milliGRAM(s) Oral three times a day  insulin lispro (HumaLOG) corrective regimen sliding scale   SubCutaneous three times a day before meals  levothyroxine 100 MICROGram(s) Oral daily  losartan 25 milliGRAM(s) Oral daily  metoprolol succinate ER 50 milliGRAM(s) Oral daily  polyethylene glycol 3350 17 Gram(s) Oral daily    MEDICATIONS  (PRN):  meclizine 12.5 milliGRAM(s) Oral two times a day PRN Dizziness  traMADol 25 milliGRAM(s) Oral every 6 hours PRN Mild Pain to moderate pain (1 - 7)    Allergies    famotidine (Unknown)  penicillin (Rash)  Tagamet (Unknown)  Tylenol (Unknown)  Zantac (Unknown)    Intolerances    PAST MEDICAL & SURGICAL HISTORY:  Hepatitis C virus infection, unspecified chronicity  Dizziness  HTN (hypertension)  H/O abdominal hysterectomy  Status post left hip replacement    FAMILY HISTORY:  Family history of leukemia (Sibling)  Family history of diabetes mellitus (Sibling)  Family history of colon cancer in mother    SOCIAL HISTORY  Smoking History: Nonsmoker  Alcohol: No ETOH  Drugs: No Drugs    T(C): 36.9 (05-04-18 @ 05:39), Max: 37.3 (05-03-18 @ 20:55)  HR: 65 (05-04-18 @ 08:08) (64 - 66)  BP: 188/59 (05-04-18 @ 08:08) (154/49 - 188/59)  RR: 17 (05-03-18 @ 21:15) (17 - 19)  SpO2: 94% (05-03-18 @ 21:15) (93% - 94%)    LABS:                        13.2   10.1  )-----------( 168      ( 03 May 2018 11:45 )             42.8     05-04    132<L>  |  98  |  15  ----------------------------<  111<H>  3.6   |  25  |  0.74    Ca    8.9      04 May 2018 07:22  Phos  3.0     05-04  Mg     2.2     05-04    CARDIAC MARKERS ( 03 May 2018 11:45 )  <0.015 ng/mL / x     / 60 U/L / x     / <1.0 ng/mL    Microbiology      RADIOLOGY & ADDITIONAL STUDIES: (My [facility])- CXR- RUL opacity  CT chest without contrast- lobulated 5 cm x 2 cm right upper lobe. 1.5cm AUGUST nodule  Old films reviewed. CXR from 9/09 - radiologist report no lesions noted  Rib films from 2/2017- RUL opacity seen and gain on CXR 8/17. Appears to be slightly increased in size vs projection from film
HPI:  87yo Dutch-speaking female from home, walks with walker, HHA 9 hours Sun-Fri, PMH HTN, depression, Hypothyroidism, chronic dizziness (on meclizine), h/o Hepatitis C (treated in 2016), hypothyroidism and PSH of hysterectomy and L hip replacement presented with mechanical fall in AM. Patient was trying to get out of bed and use the commode and fell down hitting her back. Denies loss of consciousness, dizziness, palpitations and chest pain.  Patient had recent cath in january for shortness of breath and was told that is normal. Discussed with granddaughter at bedside, who is a doctor also.   Sh: non smoker, non alcoholic, denies illicit drug use. Lives alone with HHA. FH: Colon cancer in mother and son. (03 May 2018 16:55)      86 year old female, s/p fall.  Pt complaining of severe lower back pain.  Pain radiates down right leg.  No nausea or vomiting. CT shows 1. An acute compression fracture of L2 is noted. + spasms.      PAIN SCORE:  5/10       SCALE USED: (1-10 VNRS)      PAST MEDICAL & SURGICAL HISTORY:  Hepatitis C virus infection, unspecified chronicity  Dizziness  HTN (hypertension)  H/O abdominal hysterectomy  Status post left hip replacement      FAMILY HISTORY:  Family history of leukemia (Sibling)  Family history of diabetes mellitus (Sibling)  Family history of colon cancer in mother      SOCIAL HISTORY:  [ ] Denies Smoking, Alcohol, or Drug Use    Allergies    famotidine (Unknown)  penicillin (Rash)  Tagamet (Unknown)  Tylenol (Unknown)  Zantac (Unknown)    Intolerances        PAIN MEDICATIONS:  escitalopram 5 milliGRAM(s) Oral daily  meclizine 12.5 milliGRAM(s) Oral two times a day PRN  traMADol 25 milliGRAM(s) Oral every 6 hours PRN    Heme:  enoxaparin Injectable 40 milliGRAM(s) SubCutaneous daily    Antibiotics:    Cardiovascular:  amLODIPine   Tablet 5 milliGRAM(s) Oral daily  hydrALAZINE 100 milliGRAM(s) Oral three times a day  losartan 100 milliGRAM(s) Oral daily  metoprolol succinate ER 50 milliGRAM(s) Oral daily    GI:  bisacodyl Suppository 10 milliGRAM(s) Rectal daily PRN  polyethylene glycol 3350 17 Gram(s) Oral daily    Endocrine:  dexamethasone     Tablet 4 milliGRAM(s) Oral every 6 hours  insulin lispro (HumaLOG) corrective regimen sliding scale   SubCutaneous three times a day before meals  levothyroxine 100 MICROGram(s) Oral daily    All Other Medications:          Vital Signs Last 24 Hrs  T(C): 36.6 (04 May 2018 14:05), Max: 37.3 (03 May 2018 20:55)  T(F): 97.9 (04 May 2018 14:05), Max: 99.2 (03 May 2018 20:55)  HR: 67 (04 May 2018 14:05) (64 - 67)  BP: 186/77 (04 May 2018 14:05) (154/49 - 188/59)  BP(mean): --  RR: 16 (04 May 2018 14:05) (16 - 19)  SpO2: 95% (04 May 2018 14:05) (92% - 95%)                       LABS:                          13.2   10.1  )-----------( 168      ( 03 May 2018 11:45 )             42.8     05-04    132<L>  |  98  |  15  ----------------------------<  111<H>  3.6   |  25  |  0.74    Ca    8.9      04 May 2018 07:22  Phos  3.0     05-04  Mg     2.2     05-04            RADIOLOGY:  < from: CT Chest No Cont (05.04.18 @ 08:39) >  EXAM:  CT CHEST                            PROCEDURE DATE:  05/04/2018          INTERPRETATION:  EXAMINATION: CT CHEST    CLINICAL INDICATION: Abnormal chest x-ray.    TECHNIQUE: Noncontrast CT of the chest was obtained.      COMPARISON: None.    FINDINGS:     AIRWAYS AND LUNGS: The central tracheobronchial tree is patent.  5 x 2.3   cm right upper lobe mass along right major fissure. Suggestion of 1.5 cm   left upper lobe nodule medially (2, 36).    MEDIASTINUM AND PLEURA: There are no enlarged mediastinal, hilar or   axillary lymph nodes. The visualized portion of the thyroid gland is   unremarkable. There is no pleural effusion. There is no pneumothorax.      HEART AND VESSELS: There is cardiomegaly.  There are atherosclerotic   calcifications of the aorta and coronary arteries.  There is no   pericardial effusion.  Aortic valve calcifications.    UPPER ABDOMEN: Images of the upper abdomen demonstrate no abnormality.     BONES AND SOFT TISSUES: Degenerative changes of the spine with volume   loss of the lower thoracic/upper lumbar vertebral bodies.  The soft   tissues are unremarkable.    TUBES/LINES: None.    IMPRESSION:   5 x 2.3 cm right upper lobe mass along right major fissure. Suggestion of   1.5 cm left upper lobe nodule medially.      < end of copied text >  < from: CT Lumbar Spine No Cont (05.03.18 @ 09:37) >  EXAM:  CT LUMBAR SPINE                            PROCEDURE DATE:  05/03/2018          INTERPRETATION:  INDICATION:  Status post fall. Back pain  TECHNIQUE:  A 2.5mm thin section CT study of the lumbar spine was   conducted, with axial imaging fromL1 to S1.  coronal and sagittal   reformations were generated from the axial data. 3-D imaging was   performed.    COMPARISON EXAMINATION:CT 2/5/2017      FINDINGS:    ALIGNMENT:  Unremarkable  VERTEBRAL BODIES:  There is an acute fracture of L2. There is mild to   moderate loss of height, with superior endplate and superior body   fractures. There is also a fracture which extends posteriorly with   minimal bony retropulsion. A chronic fracture is noted of the T12.  DISC SPACES:    L1-2:   Bulging of the disc annulus is noted.  L2-3:   Concentric bulging of the disc annulus is noted..  L3-4:   Concentric bulging of the disc annulus is noted. Facets and   ligaments are thickened.  L4-5:   Concentric bulging is noted with moderate thickening offacets   and ligaments. There is additional mild left foraminal prolapse.  L5-S1:   Mild bulging is noted.  POSTERIOR ELEMENTS:  Hypertrophic changes are noted  CANAL AND FORAMINA:  Narrowing is noted in the mid the lower lumbar   region without tight stenosis.  MISCELLANEOUS:  The aorta is extensively calcified.    IMPRESSION:  1. An acute compression fracture of L2 is noted. Also noted is a chronic   compression deformity of T12. See above.  2. Disc bulges and degenerative changes in the mid and lower lumbar   region.      < end of copied text >    Drug Screen:            [ ]  [place]  Reviewed and Copied to Chart
Pt Name: ABNER WILLS  MRN: [id number]    ORTHOPEDIC CONSULT:    Orthopedic diagnosis:    HPI: Patient is a 87y Female presenting with lower back pain s/p fall yesterday morning. History obtained from daughter on the phone. States patient was trying to get up and walk to use commode when she fell on her back. Pain is generalized in the lower back and non radiating. Daughter states to similar episode last year when patient fell and also had back pain. She was discharged and received physical therapy. Denies any CP, SOB, paresthesias. No head trauma, LOC, dizziness.     AMBULATION: Baseline Ambulation with walker    PAST MEDICAL & SURGICAL HISTORY:  Hepatitis C virus infection, unspecified chronicity  Dizziness  HTN (hypertension)  H/O abdominal hysterectomy  Status post left hip replacement      ALLERGIES: famotidine (Unknown)  penicillin (Rash)  Tagamet (Unknown)  Tylenol (Unknown)  Zantac (Unknown)      MEDICATIONS: enoxaparin Injectable 40 milliGRAM(s) SubCutaneous daily  escitalopram 5 milliGRAM(s) Oral daily  hydrALAZINE 50 milliGRAM(s) Oral three times a day  insulin lispro (HumaLOG) corrective regimen sliding scale   SubCutaneous three times a day before meals  levothyroxine 100 MICROGram(s) Oral daily  losartan 25 milliGRAM(s) Oral daily  meclizine 12.5 milliGRAM(s) Oral two times a day PRN  metoprolol succinate ER 50 milliGRAM(s) Oral daily  polyethylene glycol 3350 17 Gram(s) Oral daily  traMADol 25 milliGRAM(s) Oral every 6 hours PRN      PHYSICAL EXAM:    Vital Signs Last 24 Hrs  T(C): 36.9 (04 May 2018 05:39), Max: 37.3 (03 May 2018 20:55)  T(F): 98.4 (04 May 2018 05:39), Max: 99.2 (03 May 2018 20:55)  HR: 65 (04 May 2018 08:08) (64 - 66)  BP: 188/59 (04 May 2018 08:08) (154/49 - 188/59)  BP(mean): --  RR: 17 (03 May 2018 21:15) (17 - 19)  SpO2: 94% (03 May 2018 21:15) (93% - 94%)    Back: Skin intact. No scars abrasions. Tenderness to lower back.  Lower Extremities. Sensation intact. 1+ pulses distally. Calves soft and NT B/L. ROM of toes and ankles B/L. Patient able to straight leg raise bilaterally.        LABS:                        13.2   10.1  )-----------( 168      ( 03 May 2018 11:45 )             42.8     05-04    132<L>  |  98  |  15  ----------------------------<  111<H>  3.6   |  25  |  0.74    Ca    8.9      04 May 2018 07:22  Phos  3.0     05-04  Mg     2.2     05-04          RADIOLOGY: CT Lumbar Spine: 1. An acute compression fracture of L2 is noted. Also noted is a chronic   compression deformity of T12. See above.  2. Disc bulges and degenerative changes in the mid and lower lumbar   region.    IMPRESSION: Pt is a  87y Female with L2 Compression Fx    PLAN:  -  Pain management  -  DVT prophylaxis  -  Daily PT- WBAT with walker  -  No surgical intervention at this time  -  Treat compression fracture conservatively  -  Case discussed with Dr. Eid

## 2018-05-04 NOTE — PROGRESS NOTE ADULT - ATTENDING COMMENTS
Patient seen and examined; Agree with PGY 3 A/P above with editing as needed. Discussed with Dr. Romero    Patient was seen and examined 11.15 AM; has pain with movement. No BM; Spoke with Granddaughter who is family Physician Ebony Enedina [phone]; later visited her in the afternoon but did not participated yet due to pain and increased BP. Also had some nausea/ vomit after eating salad in the afternoon. but was fine when I visited.    Vital Signs Last 24 Hrs  T(C): 36.6 (04 May 2018 14:05), Max: 37.3 (03 May 2018 20:55)  T(F): 97.9 (04 May 2018 14:05), Max: 99.2 (03 May 2018 20:55)  HR: 67 (04 May 2018 14:05) (65 - 67)  BP: 186/77 (04 May 2018 14:05) (154/49 - 188/59)  RR: 16 (04 May 2018 14:05) (16 - 19)  SpO2: 95% (04 May 2018 14:05) (92% - 95%) Patient seen and examined; Agree with PGY 3 A/P above with editing as needed. Discussed with Dr. Romero    Patient was seen and examined 11.15 AM; has pain with movement. No BM; Spoke with Granddaughter who is family Physician Ebony Mcconnell [phone]; later visited her in the afternoon but did not participated yet due to pain and increased BP. Also had some nausea/ vomit after eating salad in the afternoon. but was fine when I visited. As per granddaughter Hydralazine was recently increased to 100 mg q 8 hrs;     Vital Signs Last 24 Hrs  T(C): 36.6 (04 May 2018 14:05), Max: 37.3 (03 May 2018 20:55)  T(F): 97.9 (04 May 2018 14:05), Max: 99.2 (03 May 2018 20:55)  HR: 67 (04 May 2018 14:05) (65 - 67)  BP: 186/77 (04 May 2018 14:05) (154/49 - 188/59)  RR: 16 (04 May 2018 14:05) (16 - 19)  SpO2: 95% (04 May 2018 14:05) (92% - 95%)    P/E:  Neuro: AAO x 3; No focal deficits  CVS: S1S2 present, Regular  Resp: BLAE+, No wheeze or Rhonchi  GI: Soft, BS+, NT, mildly distended  Extr; No edema or calf tenderness B/L LE    Labs: reviewed; As above    CT Chest No Cont (05.04.18 @ 08:39)    IMPRESSION: 5 x 2.3 cm right upper lobe mass along right major fissure. Suggestion of 1.5 cm left upper lobe nodule medially.      D/D:  Acute Pain due to Acute Compression fracture L2 vertebrae s/p Fall  Uncontrolled HTN  Newly seen RUL lung mass  Hypothyroidism adequately Rx    Plan:  Continue Tramadol prn; Will add decadron for 2 days for acute inflammation; Flexeril prn  Pain mgmt. consulted d/w NP Naa; Add Neurontin low dose at bedtime;   Increase Hydralazine to 100 mg thrice a day; 50 mg stat; Increase Losartan to 50 mg daily (Patient on Edarbi twice daily at home)  Continue Toprol Xl  Pt evaluation in AM with better BP control; BP partly elevated due to pain  Discussed with Grand daughter at length over the phone, updated plan of care  In my opinion, Patient will likely need increased HHA hours as Patient has recurrent falls and likely will need assistance with RW to ambulate.  d/w  Pam  If Patient doing well can consider resumption of HHA over the weekend but d/w grand daughter likely discharge on Monday;  I had asked  Pam to inform TC for resumption of Home care Monday discharge likely.    Discussed with PGY3 Dr. Romero/ RN Carine/ CM/ PT

## 2018-05-04 NOTE — PROGRESS NOTE ADULT - ASSESSMENT
87yo Burundian-speaking female from home, walks with walker, HHA 9 hours Sun-Fri, PMH HTN, depression, Hypothyroidism, chronic dizziness (on meclizine), h/o Hepatitis C (treated in 2016), hypothyroidism and PSH of hysterectomy and L hip replacement presented with mechanical fall in

## 2018-05-04 NOTE — PROGRESS NOTE ADULT - PROBLEM SELECTOR PLAN 3
Continue synthroid 100 mcg daily   TSH 5.16 Patient is on Edarbi, Toprol XL and hydralazine at home.   BP is running high likely due to pain  Continue home regimen and follow BP trends

## 2018-05-04 NOTE — PROGRESS NOTE ADULT - PROBLEM SELECTOR PLAN 4
IMPROVE VTE score: 2, Lovenox for DVT  [ ] Previous VTE                                                3  [ ] Thrombophilia                                             2  [ ] Lower limb paralysis                                  2        (unable to hold up >15 seconds)    [ ] Current Cancer (within 6 months)            2   [x] Immobilization > 24 hrs                              1  [ ] ICU/CCU stay > 24 hours                            1  [x] Age > 60                                                         1 Continue synthroid 100 mcg daily   TSH 5.16

## 2018-05-04 NOTE — CONSULT NOTE ADULT - ATTENDING COMMENTS
I saw and evaluated pt in bed with daughter at bedside.She states that mother has had multiple falls and was just recovering from fall with back pain when she fell again and has c/o marked low back pain with no radiation to legs. She says she was told back was why she keeps falling but due to falls she has walked with walker very little in past several months. Evaluated by PT for mobilization and ambulation but did little due to pain.  Has tenderness in lower back but no point tenderness localized over spinous processes. CT Scan of LS Spine and CT Scan of chest shows spondylosis with acute L2 superior endplate compression fx and old T11 and T12 sompression fxs with only minimal canal compromise.  Recommend pain control and mobilization with PT ambulation. Daughter knows from recent experience that pain improves as fx heals. Important to try to avoid falls and needs to strengthen legs Discussed with daughter.

## 2018-05-04 NOTE — PROGRESS NOTE ADULT - PROBLEM SELECTOR PLAN 2
Patient is on Edarbi, Toprol XL and hydralazine at home.   BP is running high likely due to pain  Continue home regimen and follow BP trends CT chest showed 5 x 2.3 cm right upper lobe mass along right major fissure. Suggestion of 1.5 cm left upper lobe nodule medially.  Known mass from before   -Pulm consult requested Dr. Maria

## 2018-05-04 NOTE — PROGRESS NOTE ADULT - SUBJECTIVE AND OBJECTIVE BOX
HPI:  87yo South African-speaking female from home, walks with walker, HHA 9 hours Sun-Fri, PMH HTN, depression, Hypothyroidism, chronic dizziness (on meclizine), h/o Hepatitis C (treated in 2016), hypothyroidism and PSH of hysterectomy and L hip replacement presented with mechanical fall in AM. Patient was trying to get out of bed and use the commode and fell down hitting her back. Denies loss of consciousness, dizziness, palpitations and chest pain.  Patient had recent cath in january for shortness of breath and was told that is normal. Discussed with granddaughter at bedside, who is a doctor also.   Sh: non smoker, non alcoholic, denies illicit drug use. Lives alone with HHA. FH: Colon cancer in mother and son. (03 May 2018 16:55)      Patient is a 87y old  Female who presents with a chief complaint of Mechanical fall (03 May 2018 16:55)      INTERVAL HPI/OVERNIGHT EVENTS: PT c/o pain, son in law reported that she has 8 hours of HHA and has been falling 20 times past month. He is interested in more hours of help at home.       T(C): 36.9 (05-04-18 @ 05:39), Max: 37.3 (05-03-18 @ 20:55)  HR: 65 (05-04-18 @ 08:08) (64 - 66)  BP: 188/59 (05-04-18 @ 08:08) (154/49 - 188/59)  RR: 17 (05-03-18 @ 21:15) (17 - 19)  SpO2: 94% (05-03-18 @ 21:15) (93% - 94%)  Wt(kg): --  I&O's Summary      REVIEW OF SYSTEMS: denies fever, chills, SOB, palpitations, chest pain, abdominal pain, nausea, vomiting diarrhea, constipation, dizziness    MEDICATIONS  (STANDING):  enoxaparin Injectable 40 milliGRAM(s) SubCutaneous daily  escitalopram 5 milliGRAM(s) Oral daily  hydrALAZINE 50 milliGRAM(s) Oral three times a day  insulin lispro (HumaLOG) corrective regimen sliding scale   SubCutaneous three times a day before meals  levothyroxine 100 MICROGram(s) Oral daily  losartan 25 milliGRAM(s) Oral daily  metoprolol succinate ER 50 milliGRAM(s) Oral daily    MEDICATIONS  (PRN):  meclizine 12.5 milliGRAM(s) Oral two times a day PRN Dizziness  traMADol 25 milliGRAM(s) Oral every 6 hours PRN Mild Pain to moderate pain (1 - 7)      PHYSICAL EXAM:  GENERAL: Mild distress due to pain   HEAD:  Atraumatic, Normocephalic  NERVOUS SYSTEM:  Alert & Oriented X3,non focal   CHEST/LUNG: Clear to percussion bilaterally; No rales, rhonchi, wheezing, or rubs  HEART: Regular rate and rhythm; No murmurs, rubs, or gallops  ABDOMEN: Soft, Nontender, Nondistended; Bowel sounds present  EXTREMITIES:  2+ Peripheral Pulses, No clubbing, cyanosis, or edema  LABS:                        13.2   10.1  )-----------( 168      ( 03 May 2018 11:45 )             42.8     05-04    132<L>  |  98  |  15  ----------------------------<  111<H>  3.6   |  25  |  0.74    Ca    8.9      04 May 2018 07:22  Phos  3.0     05-04  Mg     2.2     05-04          CAPILLARY BLOOD GLUCOSE      POCT Blood Glucose.: 138 mg/dL (04 May 2018 08:51)  POCT Blood Glucose.: 117 mg/dL (03 May 2018 17:03)

## 2018-05-04 NOTE — CONSULT NOTE ADULT - PROBLEM SELECTOR RECOMMENDATION 9
- decadron 4mg po q 6 hours atc  - no tylenol due to allergy  - flexeril 5mg po q 8  - gabapentin at night  - nsaids prn  - lidocaine patch daily  - tramadol po prn  - stool softeners

## 2018-05-05 LAB
GLUCOSE BLDC GLUCOMTR-MCNC: 152 MG/DL — HIGH (ref 70–99)
GLUCOSE BLDC GLUCOMTR-MCNC: 171 MG/DL — HIGH (ref 70–99)
GLUCOSE BLDC GLUCOMTR-MCNC: 184 MG/DL — HIGH (ref 70–99)
GLUCOSE BLDC GLUCOMTR-MCNC: 191 MG/DL — HIGH (ref 70–99)

## 2018-05-05 PROCEDURE — 99233 SBSQ HOSP IP/OBS HIGH 50: CPT

## 2018-05-05 RX ORDER — AMLODIPINE BESYLATE 2.5 MG/1
10 TABLET ORAL DAILY
Qty: 0 | Refills: 0 | Status: DISCONTINUED | OUTPATIENT
Start: 2018-05-05 | End: 2018-05-07

## 2018-05-05 RX ADMIN — LIDOCAINE 1 PATCH: 4 CREAM TOPICAL at 05:51

## 2018-05-05 RX ADMIN — TRAMADOL HYDROCHLORIDE 25 MILLIGRAM(S): 50 TABLET ORAL at 21:57

## 2018-05-05 RX ADMIN — TRAMADOL HYDROCHLORIDE 25 MILLIGRAM(S): 50 TABLET ORAL at 22:00

## 2018-05-05 RX ADMIN — Medication 4 MILLIGRAM(S): at 18:02

## 2018-05-05 RX ADMIN — ESCITALOPRAM OXALATE 5 MILLIGRAM(S): 10 TABLET, FILM COATED ORAL at 12:28

## 2018-05-05 RX ADMIN — GABAPENTIN 100 MILLIGRAM(S): 400 CAPSULE ORAL at 21:58

## 2018-05-05 RX ADMIN — Medication 100 MILLIGRAM(S): at 05:42

## 2018-05-05 RX ADMIN — Medication 10 MILLIGRAM(S): at 18:04

## 2018-05-05 RX ADMIN — Medication 100 MILLIGRAM(S): at 18:02

## 2018-05-05 RX ADMIN — ENOXAPARIN SODIUM 40 MILLIGRAM(S): 100 INJECTION SUBCUTANEOUS at 12:33

## 2018-05-05 RX ADMIN — LIDOCAINE 1 PATCH: 4 CREAM TOPICAL at 12:25

## 2018-05-05 RX ADMIN — Medication 50 MILLIGRAM(S): at 05:42

## 2018-05-05 RX ADMIN — Medication 4 MILLIGRAM(S): at 23:17

## 2018-05-05 RX ADMIN — Medication 100 MICROGRAM(S): at 05:42

## 2018-05-05 RX ADMIN — POLYETHYLENE GLYCOL 3350 17 GRAM(S): 17 POWDER, FOR SOLUTION ORAL at 12:28

## 2018-05-05 RX ADMIN — LOSARTAN POTASSIUM 100 MILLIGRAM(S): 100 TABLET, FILM COATED ORAL at 05:42

## 2018-05-05 RX ADMIN — Medication 100 MILLIGRAM(S): at 21:58

## 2018-05-05 RX ADMIN — AMLODIPINE BESYLATE 5 MILLIGRAM(S): 2.5 TABLET ORAL at 05:42

## 2018-05-05 RX ADMIN — Medication 100 MILLIGRAM(S): at 15:34

## 2018-05-05 RX ADMIN — Medication 4 MILLIGRAM(S): at 05:42

## 2018-05-05 RX ADMIN — SENNA PLUS 2 TABLET(S): 8.6 TABLET ORAL at 21:58

## 2018-05-05 RX ADMIN — Medication 4 MILLIGRAM(S): at 12:28

## 2018-05-05 NOTE — PROGRESS NOTE ADULT - SUBJECTIVE AND OBJECTIVE BOX
Patient is a 87y old  Female who presents with a chief complaint of Mechanical fall (03 May 2018 16:55)      INTERVAL HPI/OVERNIGHT EVENTS: No acute overnight event, Pt reported much improvement in pain. Able to go to bathroom with assistance. Daughter at bedside c/o constipation   T(C): 36.6 (05-05-18 @ 05:32), Max: 37.4 (05-04-18 @ 20:30)  HR: 68 (05-05-18 @ 05:32) (66 - 68)  BP: 155/60 (05-05-18 @ 05:32) (155/60 - 186/77)  RR: 20 (05-05-18 @ 05:32) (16 - 20)  SpO2: 93% (05-05-18 @ 05:32) (92% - 95%)  Wt(kg): --  I&O's Summary      REVIEW OF SYSTEMS: denies fever, chills, SOB, palpitations, chest pain, abdominal pain, nausea, vomitting, diarrhea,       MEDICATIONS  (STANDING):  amLODIPine   Tablet 10 milliGRAM(s) Oral daily  dexamethasone     Tablet 4 milliGRAM(s) Oral every 6 hours  docusate sodium 100 milliGRAM(s) Oral two times a day  enoxaparin Injectable 40 milliGRAM(s) SubCutaneous daily  escitalopram 5 milliGRAM(s) Oral daily  gabapentin 100 milliGRAM(s) Oral at bedtime  hydrALAZINE 100 milliGRAM(s) Oral three times a day  insulin lispro (HumaLOG) corrective regimen sliding scale   SubCutaneous three times a day before meals  levothyroxine 100 MICROGram(s) Oral daily  lidocaine   Patch 1 Patch Transdermal daily  losartan 100 milliGRAM(s) Oral daily  metoprolol succinate ER 50 milliGRAM(s) Oral daily  polyethylene glycol 3350 17 Gram(s) Oral daily  senna 2 Tablet(s) Oral at bedtime    MEDICATIONS  (PRN):  bisacodyl Suppository 10 milliGRAM(s) Rectal daily PRN Constipation  cyclobenzaprine 5 milliGRAM(s) Oral three times a day PRN Muscle Spasm  meclizine 12.5 milliGRAM(s) Oral two times a day PRN Dizziness  traMADol 25 milliGRAM(s) Oral every 6 hours PRN Mild Pain to moderate pain (1 - 7)      PHYSICAL EXAM:  GENERAL: NAD,   NECK: Supple, No JVD  NERVOUS SYSTEM:  Alert & Oriented X3, non focal  CHEST/LUNG: Clear to percussion bilaterally; No rales, rhonchi, wheezing, or rubs  HEART: Regular rate and rhythm; No murmurs, rubs, or gallops  ABDOMEN: Soft, Nontender, Nondistended; Bowel sounds present  EXTREMITIES:  2+ Peripheral Pulses, No clubbing, cyanosis, or edema    LABS:    05-04    132<L>  |  98  |  15  ----------------------------<  111<H>  3.6   |  25  |  0.74    Ca    8.9      04 May 2018 07:22  Phos  3.0     05-04  Mg     2.2     05-04          CAPILLARY BLOOD GLUCOSE      POCT Blood Glucose.: 191 mg/dL (05 May 2018 07:38)  POCT Blood Glucose.: 153 mg/dL (04 May 2018 21:05)  POCT Blood Glucose.: 118 mg/dL (04 May 2018 16:24) Patient is a 87 year old  Female who presents with a chief complaint of Mechanical fall (03 May 2018 16:55)      INTERVAL HPI/OVERNIGHT EVENTS: No acute overnight event, Pt reported much improvement in pain. Able to go to bathroom with assistance. Daughter at bedside c/o constipation     REVIEW OF SYSTEMS: denies fever, chills, SOB, palpitations, chest pain, abdominal pain, nausea, vomiting diarrhea, + constipation  All other ROS are negative       MEDICATIONS  (STANDING):  amLODIPine   Tablet 10 milliGRAM(s) Oral daily  dexamethasone     Tablet 4 milliGRAM(s) Oral every 6 hours  docusate sodium 100 milliGRAM(s) Oral two times a day  enoxaparin Injectable 40 milliGRAM(s) SubCutaneous daily  escitalopram 5 milliGRAM(s) Oral daily  gabapentin 100 milliGRAM(s) Oral at bedtime  hydrALAZINE 100 milliGRAM(s) Oral three times a day  insulin lispro (HumaLOG) corrective regimen sliding scale   SubCutaneous three times a day before meals  levothyroxine 100 MICROGram(s) Oral daily  lidocaine   Patch 1 Patch Transdermal daily  losartan 100 milliGRAM(s) Oral daily  metoprolol succinate ER 50 milliGRAM(s) Oral daily  polyethylene glycol 3350 17 Gram(s) Oral daily  senna 2 Tablet(s) Oral at bedtime    MEDICATIONS  (PRN):  bisacodyl Suppository 10 milliGRAM(s) Rectal daily PRN Constipation  cyclobenzaprine 5 milliGRAM(s) Oral three times a day PRN Muscle Spasm  meclizine 12.5 milliGRAM(s) Oral two times a day PRN Dizziness  traMADol 25 milliGRAM(s) Oral every 6 hours PRN Mild Pain to moderate pain (1 - 7)      PHYSICAL EXAM:  T(C): 36.6 (05-05-18 @ 05:32), Max: 37.4 (05-04-18 @ 20:30)  HR: 68 (05-05-18 @ 05:32) (66 - 68)  BP: 155/60 (05-05-18 @ 05:32) (155/60 - 186/77)  RR: 20 (05-05-18 @ 05:32) (16 - 20)  SpO2: 93% (05-05-18 @ 05:32) (92% - 95%)    GENERAL: NAD,   NECK: Supple, No JVD  NERVOUS SYSTEM:  Alert & Oriented X3, non focal  CHEST/LUNG: Clear to percussion bilaterally; No rales, rhonchi, wheezing, or rubs  HEART: Regular rate and rhythm; No murmurs, rubs, or gallops  ABDOMEN: Soft, Nontender, Nondistended; Bowel sounds present  EXTREMITIES:  2+ Peripheral Pulses, No clubbing, cyanosis, or edema    LABS:    05-04    132<L>  |  98  |  15  ----------------------------<  111<H>  3.6   |  25  |  0.74    Ca    8.9      04 May 2018 07:22  Phos  3.0     05-04  Mg     2.2     05-04          CAPILLARY BLOOD GLUCOSE  POCT Blood Glucose.: 191 mg/dL (05 May 2018 07:38)  POCT Blood Glucose.: 153 mg/dL (04 May 2018 21:05)  POCT Blood Glucose.: 118 mg/dL (04 May 2018 16:24)

## 2018-05-05 NOTE — PROGRESS NOTE ADULT - PROBLEM SELECTOR PLAN 1
-Acute compression fracture of L2  -No surgical intervention at this time  - Treat compression fracture conservatively as per Dr. Eid   -Weight bearing as tolerated   -Awaiting PT eval  -Pain better controlled with lidoacine patch, gabapentin and tramadol PRN -Acute compression fracture of L2  -No surgical intervention at this time  - Treat compression fracture conservatively as per Dr. Eid   -Weight bearing as tolerated   -Awaiting PT eval  -Pain controlled

## 2018-05-05 NOTE — PROGRESS NOTE ADULT - ASSESSMENT
85yo Guinean-speaking female from home, walks with walker, HHA 9 hours Sun-Fri, PMH HTN, depression, Hypothyroidism, chronic dizziness (on meclizine), h/o Hepatitis C (treated in 2016), hypothyroidism and PSH of hysterectomy and L hip replacement presented with mechanical fall. 86 year old [place] Sudanese-speaking female from home, walks with walker, HHA 9 hours Sun-Fri, PMH HTN, depression, Hypothyroidism, chronic dizziness (on meclizine), h/o Hepatitis C (treated in 2016), hypothyroidism and PSH of hysterectomy and L hip replacement presented with mechanical fall.

## 2018-05-05 NOTE — PROGRESS NOTE ADULT - ATTENDING COMMENTS
Patient seen and examined. Patient's history, vitals, labs, imaging studies reviewed. Discussed with above resident, agree with note with edits.   Acute Pain due to Acute Compression fracture L2 vertebrae s/p Fall, BP better controlled after adjustment of BP medications, Newly seen RUL lung mass, f/u pulm, Hypothyroidism adequately treated. Plan of care discussed with patient, and daughter at bedside and agrees, all questions answered.   Clarita Crespo MD  5/5/2018

## 2018-05-05 NOTE — PROGRESS NOTE ADULT - PROBLEM SELECTOR PLAN 3
-Blood pressure better controlled   -Will increase amlodipine to 10 mg as pt takes 10 mg at home -Acute compression fracture of L2  - Physical therapy eval pending  -needs assistance with rolling waker as she has recurrent falls

## 2018-05-05 NOTE — PROGRESS NOTE ADULT - PROBLEM SELECTOR PLAN 2
CT chest showed 5 x 2.3 cm right upper lobe mass along right major fissure. Suggestion of 1.5 cm left upper lobe nodule medially.  -Slow growth since 2007 as per Pulm Dr. Maria   -Will follow up with Dr. Maria regarding family decision to pursue with biopsy or PET scan secondary to above  Pain better controlled with lidocaine patch, gabapentin and tramadol PRN  decadron for 2 days for acute inflammation; Flexeril prn  pain consult following

## 2018-05-05 NOTE — PHYSICAL THERAPY INITIAL EVALUATION ADULT - ADDITIONAL COMMENTS
Patient lives dru building apartment with elevator access/no stairs.  Patient is independent with short distance indoor ambulation with rollator, requires assistance with bathroom transfers and ADLs.

## 2018-05-05 NOTE — PHYSICAL THERAPY INITIAL EVALUATION ADULT - CRITERIA FOR SKILLED THERAPEUTIC INTERVENTIONS
functional limitations in following categories/impairments found/risk reduction/prevention/rehab potential/anticipated discharge recommendation

## 2018-05-05 NOTE — PROGRESS NOTE ADULT - PROBLEM SELECTOR PLAN 5
IMPROVE VTE score: 2, Lovenox for DVT  [ ] Previous VTE                                                3  [ ] Thrombophilia                                             2  [ ] Lower limb paralysis                                  2        (unable to hold up >15 seconds)    [ ] Current Cancer (within 6 months)            2   [x] Immobilization > 24 hrs                              1  [ ] ICU/CCU stay > 24 hours                            1  [x] Age > 60                                                         1 -Blood pressure better controlled , after BP medications adjusted  Hydralazine to 100 mg thrice a day; Losartan to 100 mg daily (Patient on Edarbi twice daily at home)  Continue Toprol Xl 50 mg daily  -Will increase amlodipine to 10 mg as pt takes 10 mg at home

## 2018-05-05 NOTE — PROGRESS NOTE ADULT - PROBLEM SELECTOR PLAN 4
Continue synthroid 100 mcg daily   TSH 5.16 Newly seen RUL mass  CT chest showed 5 x 2.3 cm right upper lobe mass along right major fissure. Suggestion of 1.5 cm left upper lobe nodule medially.  -Slow growth since 2007 as per Pulm Dr. Maria   -Will follow up with Dr. Maria regarding family decision to pursue with biopsy or PET scan

## 2018-05-06 LAB
ALBUMIN SERPL ELPH-MCNC: 3.3 G/DL — LOW (ref 3.5–5)
ALP SERPL-CCNC: 37 U/L — LOW (ref 40–120)
ALT FLD-CCNC: 23 U/L DA — SIGNIFICANT CHANGE UP (ref 10–60)
ANION GAP SERPL CALC-SCNC: 8 MMOL/L — SIGNIFICANT CHANGE UP (ref 5–17)
AST SERPL-CCNC: 14 U/L — SIGNIFICANT CHANGE UP (ref 10–40)
BASOPHILS # BLD AUTO: 0 K/UL — SIGNIFICANT CHANGE UP (ref 0–0.2)
BASOPHILS NFR BLD AUTO: 0.2 % — SIGNIFICANT CHANGE UP (ref 0–2)
BILIRUB SERPL-MCNC: 0.3 MG/DL — SIGNIFICANT CHANGE UP (ref 0.2–1.2)
BUN SERPL-MCNC: 30 MG/DL — HIGH (ref 7–18)
CALCIUM SERPL-MCNC: 8.7 MG/DL — SIGNIFICANT CHANGE UP (ref 8.4–10.5)
CHLORIDE SERPL-SCNC: 100 MMOL/L — SIGNIFICANT CHANGE UP (ref 96–108)
CO2 SERPL-SCNC: 25 MMOL/L — SIGNIFICANT CHANGE UP (ref 22–31)
CREAT SERPL-MCNC: 0.78 MG/DL — SIGNIFICANT CHANGE UP (ref 0.5–1.3)
EOSINOPHIL # BLD AUTO: 0 K/UL — SIGNIFICANT CHANGE UP (ref 0–0.5)
EOSINOPHIL NFR BLD AUTO: 0 % — SIGNIFICANT CHANGE UP (ref 0–6)
GLUCOSE BLDC GLUCOMTR-MCNC: 119 MG/DL — HIGH (ref 70–99)
GLUCOSE BLDC GLUCOMTR-MCNC: 140 MG/DL — HIGH (ref 70–99)
GLUCOSE BLDC GLUCOMTR-MCNC: 141 MG/DL — HIGH (ref 70–99)
GLUCOSE BLDC GLUCOMTR-MCNC: 186 MG/DL — HIGH (ref 70–99)
GLUCOSE SERPL-MCNC: 134 MG/DL — HIGH (ref 70–99)
HCT VFR BLD CALC: 37.9 % — SIGNIFICANT CHANGE UP (ref 34.5–45)
HGB BLD-MCNC: 12 G/DL — SIGNIFICANT CHANGE UP (ref 11.5–15.5)
LYMPHOCYTES # BLD AUTO: 1.3 K/UL — SIGNIFICANT CHANGE UP (ref 1–3.3)
LYMPHOCYTES # BLD AUTO: 16.1 % — SIGNIFICANT CHANGE UP (ref 13–44)
MCHC RBC-ENTMCNC: 26.6 PG — LOW (ref 27–34)
MCHC RBC-ENTMCNC: 31.6 GM/DL — LOW (ref 32–36)
MCV RBC AUTO: 84.2 FL — SIGNIFICANT CHANGE UP (ref 80–100)
MONOCYTES # BLD AUTO: 0.2 K/UL — SIGNIFICANT CHANGE UP (ref 0–0.9)
MONOCYTES NFR BLD AUTO: 3.1 % — SIGNIFICANT CHANGE UP (ref 2–14)
NEUTROPHILS # BLD AUTO: 6.4 K/UL — SIGNIFICANT CHANGE UP (ref 1.8–7.4)
NEUTROPHILS NFR BLD AUTO: 80.6 % — HIGH (ref 43–77)
PLATELET # BLD AUTO: 132 K/UL — LOW (ref 150–400)
POTASSIUM SERPL-MCNC: 4.5 MMOL/L — SIGNIFICANT CHANGE UP (ref 3.5–5.3)
POTASSIUM SERPL-SCNC: 4.5 MMOL/L — SIGNIFICANT CHANGE UP (ref 3.5–5.3)
PROT SERPL-MCNC: 7.9 G/DL — SIGNIFICANT CHANGE UP (ref 6–8.3)
RBC # BLD: 4.5 M/UL — SIGNIFICANT CHANGE UP (ref 3.8–5.2)
RBC # FLD: 12.3 % — SIGNIFICANT CHANGE UP (ref 10.3–14.5)
SODIUM SERPL-SCNC: 133 MMOL/L — LOW (ref 135–145)
WBC # BLD: 7.9 K/UL — SIGNIFICANT CHANGE UP (ref 3.8–10.5)
WBC # FLD AUTO: 7.9 K/UL — SIGNIFICANT CHANGE UP (ref 3.8–10.5)

## 2018-05-06 PROCEDURE — 99233 SBSQ HOSP IP/OBS HIGH 50: CPT

## 2018-05-06 PROCEDURE — 74019 RADEX ABDOMEN 2 VIEWS: CPT | Mod: 26

## 2018-05-06 RX ADMIN — Medication 50 MILLIGRAM(S): at 05:47

## 2018-05-06 RX ADMIN — Medication 100 MILLIGRAM(S): at 21:03

## 2018-05-06 RX ADMIN — Medication 100 MILLIGRAM(S): at 05:47

## 2018-05-06 RX ADMIN — LIDOCAINE 1 PATCH: 4 CREAM TOPICAL at 00:37

## 2018-05-06 RX ADMIN — Medication 100 MICROGRAM(S): at 05:47

## 2018-05-06 RX ADMIN — LOSARTAN POTASSIUM 100 MILLIGRAM(S): 100 TABLET, FILM COATED ORAL at 05:47

## 2018-05-06 RX ADMIN — POLYETHYLENE GLYCOL 3350 17 GRAM(S): 17 POWDER, FOR SOLUTION ORAL at 13:17

## 2018-05-06 RX ADMIN — GABAPENTIN 100 MILLIGRAM(S): 400 CAPSULE ORAL at 21:03

## 2018-05-06 RX ADMIN — ENOXAPARIN SODIUM 40 MILLIGRAM(S): 100 INJECTION SUBCUTANEOUS at 13:17

## 2018-05-06 RX ADMIN — Medication 1: at 16:14

## 2018-05-06 RX ADMIN — SENNA PLUS 2 TABLET(S): 8.6 TABLET ORAL at 21:03

## 2018-05-06 RX ADMIN — Medication 100 MILLIGRAM(S): at 13:18

## 2018-05-06 RX ADMIN — Medication 4 MILLIGRAM(S): at 13:17

## 2018-05-06 RX ADMIN — Medication 4 MILLIGRAM(S): at 05:47

## 2018-05-06 RX ADMIN — LIDOCAINE 1 PATCH: 4 CREAM TOPICAL at 13:18

## 2018-05-06 RX ADMIN — ESCITALOPRAM OXALATE 5 MILLIGRAM(S): 10 TABLET, FILM COATED ORAL at 13:17

## 2018-05-06 RX ADMIN — Medication 100 MILLIGRAM(S): at 17:03

## 2018-05-06 RX ADMIN — AMLODIPINE BESYLATE 10 MILLIGRAM(S): 2.5 TABLET ORAL at 06:59

## 2018-05-06 NOTE — PROGRESS NOTE ADULT - PROBLEM SELECTOR PLAN 7
IMPROVE VTE score: 2, Lovenox for DVT  [ ] Previous VTE                                                3  [ ] Thrombophilia                                             2  [ ] Lower limb paralysis                                  2        (unable to hold up >15 seconds)    [ ] Current Cancer (within 6 months)            2   [x] Immobilization > 24 hrs                              1  [ ] ICU/CCU stay > 24 hours                            1  [x] Age > 60                                                         1
IMPROVE VTE score: 2, Lovenox for DVT  [ ] Previous VTE                                                3  [ ] Thrombophilia                                             2  [ ] Lower limb paralysis                                  2        (unable to hold up >15 seconds)    [ ] Current Cancer (within 6 months)            2   [x] Immobilization > 24 hrs                              1  [ ] ICU/CCU stay > 24 hours                            1  [x] Age > 60                                                         1

## 2018-05-06 NOTE — PROGRESS NOTE ADULT - PROBLEM SELECTOR PLAN 1
-Acute compression fracture of L2  -No surgical intervention at this time  - Treat compression fracture conservatively as per Dr. Eid   -Weight bearing as tolerated   -Awaiting PT eval  -Pain controlled - Acute compression fracture of L2  - No surgical intervention at this time  - Treat compression fracture conservatively as per Dr. Eid   -Weight bearing as tolerated   -Awaiting PT eval  -Pain controlled

## 2018-05-06 NOTE — PROGRESS NOTE ADULT - PROBLEM SELECTOR PLAN 5
-Blood pressure better controlled , after BP medications adjusted  Hydralazine to 100 mg thrice a day; Losartan to 100 mg daily (Patient on Edarbi twice daily at home)  Continue Toprol Xl 50 mg daily  -Will increase amlodipine to 10 mg as pt takes 10 mg at home

## 2018-05-06 NOTE — PROGRESS NOTE ADULT - ASSESSMENT
86 year old Lithuanian-speaking female from home, walks with walker, HHA 9 hours Sun-Fri, PMH HTN, depression, Hypothyroidism, chronic dizziness (on meclizine), h/o Hepatitis C (treated in 2016), hypothyroidism and PSH of hysterectomy and L hip replacement presented with mechanical fall. 86 year old Armenian-speaking female from home, walks with walker, HHA 9 hours Sun-Fri, PMH HTN, depression, Hypothyroidism, chronic dizziness (on meclizine), h/o Hepatitis C (treated in 2016), hypothyroidism and PSH of hysterectomy and L hip replacement presented with mechanical fall, found to have acute compression fracture L2 vertebrae

## 2018-05-06 NOTE — PROGRESS NOTE ADULT - PROBLEM SELECTOR PLAN 4
Newly seen RUL mass, f/u pulm  CT chest showed 5 x 2.3 cm right upper lobe mass along right major fissure. Suggestion of 1.5 cm left upper lobe nodule medially.  -Slow growth since 2007 as per Pulm Dr. Maria   -Will follow up with Dr. Maria regarding family decision to pursue with biopsy or PET scan

## 2018-05-06 NOTE — PROGRESS NOTE ADULT - SUBJECTIVE AND OBJECTIVE BOX
Patient is a 87y old  Female who presents with a chief complaint of Mechanical fall (03 May 2018 16:55)    Patient reports constipation, denies any other complaints    MEDICATIONS  (STANDING):  amLODIPine   Tablet 10 milliGRAM(s) Oral daily  docusate sodium 100 milliGRAM(s) Oral two times a day  enoxaparin Injectable 40 milliGRAM(s) SubCutaneous daily  escitalopram 5 milliGRAM(s) Oral daily  gabapentin 100 milliGRAM(s) Oral at bedtime  hydrALAZINE 100 milliGRAM(s) Oral three times a day  insulin lispro (HumaLOG) corrective regimen sliding scale   SubCutaneous three times a day before meals  levothyroxine 100 MICROGram(s) Oral daily  lidocaine   Patch 1 Patch Transdermal daily  losartan 100 milliGRAM(s) Oral daily  metoprolol succinate ER 50 milliGRAM(s) Oral daily  senna 2 Tablet(s) Oral at bedtime    MEDICATIONS  (PRN):  bisacodyl Suppository 10 milliGRAM(s) Rectal daily PRN Constipation  cyclobenzaprine 5 milliGRAM(s) Oral three times a day PRN Muscle Spasm  meclizine 12.5 milliGRAM(s) Oral two times a day PRN Dizziness  traMADol 25 milliGRAM(s) Oral every 6 hours PRN Mild Pain to moderate pain (1 - 7)          REVIEW OF SYSTEMS:  CONSTITUTIONAL: No fever, weight loss, or fatigue  EYES: No eye pain, visual disturbances, or discharge  ENMT:  No difficulty hearing, tinnitus, vertigo; No sinus or throat pain  NECK: No pain or stiffness  RESPIRATORY: No cough, wheezing, chills or hemoptysis; No shortness of breath  CARDIOVASCULAR: No chest pain, palpitations, dizziness, or leg swelling  GASTROINTESTINAL: No abdominal or epigastric pain. No nausea, vomiting, or hematemesis; No diarrhea. Has constipation. No melena or hematochezia.  GENITOURINARY: No dysuria, frequency, hematuria, or incontinence  NEUROLOGICAL: No headaches, memory loss, loss of strength, numbness, or tremors  SKIN: No itching, burning, rashes, or lesions   LYMPH NODES: No enlarged glands  ENDOCRINE: No heat or cold intolerance; No hair loss  MUSCULOSKELETAL: No joint pain or swelling; No muscle, back, or extremity pain  PSYCHIATRIC: No depression, anxiety, mood swings, or difficulty sleeping  HEME/LYMPH: No easy bruising, or bleeding gums  ALLERY AND IMMUNOLOGIC: No hives or eczema           PHYSICAL EXAM:    T(C): 36.8 (05-06-18 @ 13:29), Max: 36.8 (05-05-18 @ 14:10)  HR: 62 (05-06-18 @ 13:29) (62 - 70)  BP: 140/44 (05-06-18 @ 13:29) (140/44 - 156/67)  RR: 18 (05-06-18 @ 13:29) (18 - 18)  SpO2: 96% (05-06-18 @ 13:29) (95% - 96%)  I&O's Summary    06 May 2018 07:01  -  06 May 2018 13:43  --------------------------------------------------------  IN: 0 mL / OUT: 1 mL / NET: -1 mL      GENERAL: Elderly female, NAD  HEAD:  Atraumatic, Normocephalic  EYES: EOMI, PERRL, conjunctiva and sclera clear  ENMT: No tonsillar erythema, exudates, or enlargement; Moist mucous membranes, Good dentition, No lesions  NECK: Supple, No JVD, Normal thyroid  NERVOUS SYSTEM:  Alert & Oriented X3,   CHEST/LUNG: Clear to ascultation bilaterally; No rales, rhonchi, wheezing, or rubs  HEART: Regular rate and rhythm; No murmurs, rubs, or gallops  ABDOMEN: Soft, Nontender, Nondistended; Bowel sounds present  EXTREMITIES:  2+ Peripheral Pulses, No clubbing, cyanosis, or edema  LYMPH: No lymphadenopathy noted  SKIN: No rashes or lesions              LABS:                        12.0   7.9   )-----------( 132      ( 06 May 2018 07:36 )             37.9     05-06    133<L>  |  100  |  30<H>  ----------------------------<  134<H>  4.5   |  25  |  0.78    Ca    8.7      06 May 2018 07:36    TPro  7.9  /  Alb  3.3<L>  /  TBili  0.3  /  DBili  x   /  AST  14  /  ALT  23  /  AlkPhos  37<L>  05-06        CAPILLARY BLOOD GLUCOSE      POCT Blood Glucose.: 141 mg/dL (06 May 2018 11:51)  POCT Blood Glucose.: 140 mg/dL (06 May 2018 08:01)  POCT Blood Glucose.: 184 mg/dL (05 May 2018 21:40)  POCT Blood Glucose.: 171 mg/dL (05 May 2018 16:12)                RADIOLOGY & ADDITIONAL TESTS:    Imaging Personally Reviewed:  [x] YES  [ ] NO    Consultant(s) Notes Reviewed:  [x] YES  [ ] NO    Care Discussed with Consultants/Other Providers [x] YES  [ ] NO Patient is a 87y old  Female who presents with a chief complaint of Mechanical fall (03 May 2018 16:55)    Patient reports constipation, and abdominal discomfort, denies any other complaints    MEDICATIONS  (STANDING):  amLODIPine   Tablet 10 milliGRAM(s) Oral daily  docusate sodium 100 milliGRAM(s) Oral two times a day  enoxaparin Injectable 40 milliGRAM(s) SubCutaneous daily  escitalopram 5 milliGRAM(s) Oral daily  gabapentin 100 milliGRAM(s) Oral at bedtime  hydrALAZINE 100 milliGRAM(s) Oral three times a day  insulin lispro (HumaLOG) corrective regimen sliding scale   SubCutaneous three times a day before meals  levothyroxine 100 MICROGram(s) Oral daily  lidocaine   Patch 1 Patch Transdermal daily  losartan 100 milliGRAM(s) Oral daily  metoprolol succinate ER 50 milliGRAM(s) Oral daily  senna 2 Tablet(s) Oral at bedtime    MEDICATIONS  (PRN):  bisacodyl Suppository 10 milliGRAM(s) Rectal daily PRN Constipation  cyclobenzaprine 5 milliGRAM(s) Oral three times a day PRN Muscle Spasm  meclizine 12.5 milliGRAM(s) Oral two times a day PRN Dizziness  traMADol 25 milliGRAM(s) Oral every 6 hours PRN Mild Pain to moderate pain (1 - 7)          REVIEW OF SYSTEMS:  CONSTITUTIONAL: No fever, weight loss, or fatigue  EYES: No eye pain, visual disturbances, or discharge  ENMT:  No difficulty hearing, tinnitus, vertigo; No sinus or throat pain  NECK: No pain or stiffness  RESPIRATORY: No cough, wheezing, chills or hemoptysis; No shortness of breath  CARDIOVASCULAR: No chest pain, palpitations, dizziness, or leg swelling  GASTROINTESTINAL: No abdominal or epigastric pain. No nausea, vomiting, or hematemesis; No diarrhea. Has constipation and abdominal discomfort. No melena or hematochezia.  GENITOURINARY: No dysuria, frequency, hematuria, or incontinence  NEUROLOGICAL: No headaches, memory loss, loss of strength, numbness, or tremors  SKIN: No itching, burning, rashes, or lesions   LYMPH NODES: No enlarged glands  ENDOCRINE: No heat or cold intolerance; No hair loss  MUSCULOSKELETAL: No joint pain or swelling; No muscle, back, or extremity pain  PSYCHIATRIC: No depression, anxiety, mood swings, or difficulty sleeping  HEME/LYMPH: No easy bruising, or bleeding gums  ALLERY AND IMMUNOLOGIC: No hives or eczema           PHYSICAL EXAM:    T(C): 36.8 (05-06-18 @ 13:29), Max: 36.8 (05-05-18 @ 14:10)  HR: 62 (05-06-18 @ 13:29) (62 - 70)  BP: 140/44 (05-06-18 @ 13:29) (140/44 - 156/67)  RR: 18 (05-06-18 @ 13:29) (18 - 18)  SpO2: 96% (05-06-18 @ 13:29) (95% - 96%)  I&O's Summary    06 May 2018 07:01  -  06 May 2018 13:43  --------------------------------------------------------  IN: 0 mL / OUT: 1 mL / NET: -1 mL      GENERAL: Elderly female, NAD  HEAD:  Atraumatic, Normocephalic  EYES: EOMI, PERRL, conjunctiva and sclera clear  ENMT: No tonsillar erythema, exudates, or enlargement; Moist mucous membranes, Good dentition, No lesions  NECK: Supple, No JVD, Normal thyroid  NERVOUS SYSTEM:  Alert & Oriented X3,   CHEST/LUNG: Clear to ascultation bilaterally; No rales, rhonchi, wheezing, or rubs  HEART: Regular rate and rhythm; No murmurs, rubs, or gallops  ABDOMEN: Soft, mild discomfort, Nondistended; Bowel sounds present  EXTREMITIES:  2+ Peripheral Pulses, No clubbing, cyanosis, or edema  LYMPH: No lymphadenopathy noted  SKIN: No rashes or lesions              LABS:                        12.0   7.9   )-----------( 132      ( 06 May 2018 07:36 )             37.9     05-06    133<L>  |  100  |  30<H>  ----------------------------<  134<H>  4.5   |  25  |  0.78    Ca    8.7      06 May 2018 07:36    TPro  7.9  /  Alb  3.3<L>  /  TBili  0.3  /  DBili  x   /  AST  14  /  ALT  23  /  AlkPhos  37<L>  05-06        CAPILLARY BLOOD GLUCOSE  POCT Blood Glucose.: 141 mg/dL (06 May 2018 11:51)  POCT Blood Glucose.: 140 mg/dL (06 May 2018 08:01)  POCT Blood Glucose.: 184 mg/dL (05 May 2018 21:40)  POCT Blood Glucose.: 171 mg/dL (05 May 2018 16:12)      RADIOLOGY & ADDITIONAL TESTS:    Imaging Personally Reviewed:  [x] YES  [ ] NO    Consultant(s) Notes Reviewed:  [x] YES  [ ] NO    Care Discussed with Consultants/Other Providers [x] YES  [ ] NO

## 2018-05-06 NOTE — PROGRESS NOTE ADULT - PROBLEM SELECTOR PLAN 2
due to Acute Compression fracture L2 vertebrae s/p Fall  Pain better controlled with lidocaine patch, gabapentin and tramadol PRN  decadron for 2 days for acute inflammation; Flexeril prn  pain consult following

## 2018-05-06 NOTE — PROGRESS NOTE ADULT - ATTENDING COMMENTS
Plan of care discussed with patient, at bedside and agrees, all questions answered.   Clarita Crespo MD  5/6/2018

## 2018-05-06 NOTE — PROGRESS NOTE ADULT - PROBLEM SELECTOR PLAN 3
-Acute compression fracture of L2  - Physical therapy eval pending  -needs assistance with rolling waker as she has recurrent falls

## 2018-05-07 ENCOUNTER — TRANSCRIPTION ENCOUNTER (OUTPATIENT)
Age: 83
End: 2018-05-07

## 2018-05-07 VITALS
DIASTOLIC BLOOD PRESSURE: 64 MMHG | OXYGEN SATURATION: 97 % | SYSTOLIC BLOOD PRESSURE: 134 MMHG | TEMPERATURE: 98 F | RESPIRATION RATE: 18 BRPM | HEART RATE: 67 BPM

## 2018-05-07 LAB
GLUCOSE BLDC GLUCOMTR-MCNC: 114 MG/DL — HIGH (ref 70–99)
GLUCOSE BLDC GLUCOMTR-MCNC: 93 MG/DL — SIGNIFICANT CHANGE UP (ref 70–99)

## 2018-05-07 PROCEDURE — 99239 HOSP IP/OBS DSCHRG MGMT >30: CPT

## 2018-05-07 RX ORDER — TRAMADOL HYDROCHLORIDE 50 MG/1
0.5 TABLET ORAL
Qty: 14 | Refills: 0
Start: 2018-05-07 | End: 2018-05-13

## 2018-05-07 RX ORDER — LACTULOSE 10 G/15ML
20 SOLUTION ORAL ONCE
Qty: 0 | Refills: 0 | Status: COMPLETED | OUTPATIENT
Start: 2018-05-07 | End: 2018-05-07

## 2018-05-07 RX ORDER — GABAPENTIN 400 MG/1
1 CAPSULE ORAL
Qty: 15 | Refills: 0
Start: 2018-05-07 | End: 2018-05-21

## 2018-05-07 RX ORDER — SENNA PLUS 8.6 MG/1
2 TABLET ORAL
Qty: 30 | Refills: 0
Start: 2018-05-07 | End: 2018-05-21

## 2018-05-07 RX ORDER — DOCUSATE SODIUM 100 MG
1 CAPSULE ORAL
Qty: 30 | Refills: 0
Start: 2018-05-07 | End: 2018-05-21

## 2018-05-07 RX ORDER — HYDRALAZINE HCL 50 MG
1 TABLET ORAL
Qty: 0 | Refills: 0 | COMMUNITY

## 2018-05-07 RX ADMIN — Medication 100 MILLIGRAM(S): at 06:57

## 2018-05-07 RX ADMIN — LOSARTAN POTASSIUM 100 MILLIGRAM(S): 100 TABLET, FILM COATED ORAL at 06:57

## 2018-05-07 RX ADMIN — LIDOCAINE 1 PATCH: 4 CREAM TOPICAL at 01:30

## 2018-05-07 RX ADMIN — Medication 100 MILLIGRAM(S): at 13:40

## 2018-05-07 RX ADMIN — Medication 50 MILLIGRAM(S): at 06:57

## 2018-05-07 RX ADMIN — ENOXAPARIN SODIUM 40 MILLIGRAM(S): 100 INJECTION SUBCUTANEOUS at 12:10

## 2018-05-07 RX ADMIN — AMLODIPINE BESYLATE 10 MILLIGRAM(S): 2.5 TABLET ORAL at 06:58

## 2018-05-07 RX ADMIN — Medication 100 MICROGRAM(S): at 06:57

## 2018-05-07 RX ADMIN — LIDOCAINE 1 PATCH: 4 CREAM TOPICAL at 12:10

## 2018-05-07 RX ADMIN — TRAMADOL HYDROCHLORIDE 25 MILLIGRAM(S): 50 TABLET ORAL at 12:33

## 2018-05-07 RX ADMIN — TRAMADOL HYDROCHLORIDE 25 MILLIGRAM(S): 50 TABLET ORAL at 10:02

## 2018-05-07 RX ADMIN — LACTULOSE 20 GRAM(S): 10 SOLUTION ORAL at 10:02

## 2018-05-07 RX ADMIN — ESCITALOPRAM OXALATE 5 MILLIGRAM(S): 10 TABLET, FILM COATED ORAL at 12:10

## 2018-05-07 NOTE — DISCHARGE NOTE ADULT - CARE PROVIDER_API CALL
Robb Maria), Critical Care Medicine; Internal Medicine; Pulmonary Disease  [address] [place] [address]  Phone: [phone]  Fax: [phone]

## 2018-05-07 NOTE — PROGRESS NOTE ADULT - SUBJECTIVE AND OBJECTIVE BOX
Follow up  Awaiting family decision regarding aggressiveness of care given advanced age  They are aware of the high pretest probability for malignancy given size of lesion  Workup can be done as an outpatient. The patient can follow up with her PCP and myself if they choose to..

## 2018-05-07 NOTE — DISCHARGE NOTE ADULT - HOSPITAL COURSE
86 year old Latvian-speaking female from home, walks with walker, HHA 9 hours Sun-Fri, PMH HTN, depression, Hypothyroidism, chronic dizziness (on meclizine), h/o Hepatitis C (treated in 2016), hypothyroidism and PSH of hysterectomy and L hip replacement presented with mechanical fall.  CT spine showed An acute compression fracture of L2 is noted. Also noted is a chronic compression deformity of T12. See above. Disc bulges and degenerative changes in the mid and lower lumbar   region. Spine surgeon Dr. Eid was consulted , recommended conservative management , Weight bearing as tolerated. Pain management was consulted for better pain control. Physical therapy was requested, recommended home with home PT.   PT has 8 hours of HHA at home, as per family frequent fall recently, need more help at home,  and social work consults were requested to increase hours of HHA. 86 year old Argentine-speaking female from home, walks with walker, HHA 9 hours Sun-Fri, PMH HTN, depression, Hypothyroidism, chronic dizziness (on meclizine), h/o Hepatitis C (treated in 2016), hypothyroidism and PSH of hysterectomy and L hip replacement presented with mechanical fall.  CT spine showed An acute compression fracture of L2 is noted. Also noted is a chronic compression deformity of T12. See above. Disc bulges and degenerative changes in the mid and lower lumbar   region. Spine surgeon Dr. Eid was consulted , recommended conservative management , Weight bearing as tolerated. Pain management was consulted for better pain control. Physical therapy was requested, recommended home with home PT.   PT has 8 hours of HHA at home, as per family frequent fall recently, need more help at home,  and social work consults were requested to increase hours of HHA.   Blood pressure controlled with home regimen , pt medically stable to be discharged to home. 86 year old Tuvaluan-speaking female from home, walks with walker, HHA 9 hours Sun-Fri, PMH HTN, depression, Hypothyroidism, chronic dizziness (on meclizine), h/o Hepatitis C (treated in 2016), hypothyroidism and PSH of hysterectomy and L hip replacement presented with mechanical fall.  CT spine showed An acute compression fracture of L2 is noted. Also noted is a chronic compression deformity of T12. See above. Disc bulges and degenerative changes in the mid and lower lumbar   region. Spine surgeon Dr. Eid was consulted , recommended conservative management , Weight bearing as tolerated. Pain management was consulted for better pain control. Physical therapy was requested, recommended home with home PT.   PT has 8 hours of HHA at home, as per family frequent fall recently, need more help at home,  and social work consults were requested to increase hours of HHA.   Blood pressure controlled with home regimen.  CT chest showed 5 x 2.3 cm right upper lobe mass along right major fissure. Suggestion of 1.5 cm left upper lobe nodule medially. Slow growth since 2007   Family to decide if they want to do biopsy or PET scan ,Follow up with Dr. Crow hewitt pulm as out patient or pcp  Pt medically stable to be discharged to home.

## 2018-05-07 NOTE — DISCHARGE NOTE ADULT - PLAN OF CARE
to improve pain and mobility Weight bearing as tolerated   Continue with physical therapy at home Blood pressure controlled with home regimen   continue with home meds   Follow up with PCP TSH 5.16   Continue with home dose of levothyroxine CT chest showed 5 x 2.3 cm right upper lobe mass along right major fissure. Suggestion of 1.5 cm left upper lobe nodule medially. Slow growth since 2007   Family to decide if hey want to do biopsy or PET scan   Follow up with Dr. Maria as pulm You presented to hospital with another fall. You have a history of prior fall and compression fracture.   You was treated with pain medications including short course of steroids for 2 days, Gabapentin and Flexeril as needed and lidocaine patch with clinical improvement and able to participate in physical therapy with walker and one person assist.   You was seen by Orthopedics Spine Dr. Eid who recommended conservative treatment.  Weight bearing as tolerated. Continue with physical therapy at home with supervision. Target BP less than 140/80 mm Hg adjusted for age. Blood pressure well controlled with home regimen   Continue Norvasc 10mg daily, Hydralazine 100 mg thrice a day and Adarabi as before twice daily  Follow up with PCP Maintain Thyroid function normal limits TSH 5.16; Continue with home dose of levothyroxine  Please monitor closely every 3-6 months with PCP. Evalaute for Malignancy and metastasis CT chest showed 5 x 2.3 cm right upper lobe mass along right major fissure. Suggestion of 1.5 cm left upper lobe nodule medially. Slow growth since 2007   Discussed with Grand Daughter Ebony by my myself and Pulmonary Dr. Maria reviewed findings and given slow growth and patient age, risk of aggressive intervention more than benefits will monitor closely and follow up with Dr. Maria as outpatient in 2 weeks for a PET CT as outpatient for Prognostication and evaluation for any mets.      Follow up with Dr. Maria as pulm

## 2018-05-07 NOTE — DISCHARGE NOTE ADULT - ADDITIONAL INSTRUCTIONS
Follow up with PCP in 5 days as outpatient; Monitor BP; TSH at routine intervals as above  Pain control; Stool softeners due to risk of constipation.   Weight bearing as tolerated; Home Physical therapy with walker and one person assist.  Follow up with Pulmonary as above for a PET CT scan to follow up Lung mass

## 2018-05-07 NOTE — DISCHARGE NOTE ADULT - PATIENT PORTAL LINK FT
You can access the [facility] Patient Portal, offered by [facility], by registering with the following website: [email or web address]

## 2018-05-07 NOTE — DISCHARGE NOTE ADULT - CARE PLAN
Principal Discharge DX:	Compression fracture of L2  Goal:	to improve pain and mobility  Assessment and plan of treatment:	Weight bearing as tolerated   Continue with physical therapy at home  Secondary Diagnosis:	Hypertension  Assessment and plan of treatment:	Blood pressure controlled with home regimen   continue with home meds   Follow up with PCP  Secondary Diagnosis:	Hypothyroidism  Assessment and plan of treatment:	TSH 5.16   Continue with home dose of levothyroxine  Secondary Diagnosis:	Lung mass  Assessment and plan of treatment:	CT chest showed 5 x 2.3 cm right upper lobe mass along right major fissure. Suggestion of 1.5 cm left upper lobe nodule medially. Slow growth since 2007   Family to decide if hey want to do biopsy or PET scan   Follow up with Dr. Maria as pulm Principal Discharge DX:	Compression fracture of L2  Goal:	to improve pain and mobility  Assessment and plan of treatment:	You presented to hospital with another fall. You have a history of prior fall and compression fracture.   You was treated with pain medications including short course of steroids for 2 days, Gabapentin and Flexeril as needed and lidocaine patch with clinical improvement and able to participate in physical therapy with walker and one person assist.   You was seen by Orthopedics Spine Dr. Eid who recommended conservative treatment.  Weight bearing as tolerated. Continue with physical therapy at home with supervision.  Secondary Diagnosis:	Hypertension  Goal:	Target BP less than 140/80 mm Hg adjusted for age.  Assessment and plan of treatment:	Blood pressure well controlled with home regimen   Continue Norvasc 10mg daily, Hydralazine 100 mg thrice a day and Adarabi as before twice daily  Follow up with PCP  Secondary Diagnosis:	Hypothyroidism  Goal:	Maintain Thyroid function normal limits  Assessment and plan of treatment:	TSH 5.16; Continue with home dose of levothyroxine  Please monitor closely every 3-6 months with PCP.  Secondary Diagnosis:	Lung mass  Goal:	Evalaute for Malignancy and metastasis  Assessment and plan of treatment:	CT chest showed 5 x 2.3 cm right upper lobe mass along right major fissure. Suggestion of 1.5 cm left upper lobe nodule medially. Slow growth since 2007   Discussed with Grand Daughter Ebony by my myself and Pulmonary Dr. Maria reviewed findings and given slow growth and patient age, risk of aggressive intervention more than benefits will monitor closely and follow up with Dr. Maria as outpatient in 2 weeks for a PET CT as outpatient for Prognostication and evaluation for any mets.      Follow up with Dr. Maria as pulm

## 2018-05-07 NOTE — CHART NOTE - NSCHARTNOTEFT_GEN_A_CORE
Patient seen and examined earlier this morning 9.30 AM; Patient is doing well; OOB to chair; Clare cutteofilo distress; pain controlled; Had BMs    Vital Signs Last 24 Hrs: Stable  T(C): 36.5 (07 May 2018 14:09), Max: 36.7 (07 May 2018 05:54)  T(F): 97.7 (07 May 2018 14:09), Max: 98 (07 May 2018 05:54)  HR: 67 (07 May 2018 14:09) (61 - 67)  BP: 134/64 (07 May 2018 14:09) (133/65 - 142/58)  RR: 18 (07 May 2018 14:09) (18 - 18)  SpO2: 97% (07 May 2018 14:09) (95% - 97%)    P/E;    labs: Stable 5/6/18 Patient seen and examined earlier this morning 9.30 AM; Patient is doing well; OOB to chair; Clare cute distress; pain controlled; Had BMs    Vital Signs Last 24 Hrs: Stable  T(C): 36.5 (07 May 2018 14:09), Max: 36.7 (07 May 2018 05:54)  T(F): 97.7 (07 May 2018 14:09), Max: 98 (07 May 2018 05:54)  HR: 67 (07 May 2018 14:09) (61 - 67)  BP: 134/64 (07 May 2018 14:09) (133/65 - 142/58)  RR: 18 (07 May 2018 14:09) (18 - 18)  SpO2: 97% (07 May 2018 14:09) (95% - 97%)    P/E:  Neuro: AAO x 3; No focal deficits  CVS: S1S2 present, Regular  Resp: BLAE+, No wheeze or Rhonchi  GI: Soft, BS+, NT, mildly distended  Extr; No edema or calf tenderness B/L LE    Labs: reviewed; Stable 5/6/18      D/D:  Acute Pain due to Acute Compression fracture L2 vertebrae s/p Fall  Uncontrolled HTN better controlled   RUL lung mass concern for malignancy  Hypothyroidism adequately Rx    Plan:  Continue Tramadol prn; Lidocaine patch; Gabapentin continue  Continue Toprol XL, Hydralazine and Amlodipine as here; resume home dose Adirabi (ARB) twice daily  Discussed with Grand daughter at length over the phone, updated plan of care  She spoke with family about lung mass; will f/u with Dr. Maria for a PET CT as outpatient  Discussed with PGY3 Dr. Romero/ RN

## 2018-05-07 NOTE — DISCHARGE NOTE ADULT - MEDICATION SUMMARY - MEDICATIONS TO TAKE
I will START or STAY ON the medications listed below when I get home from the hospital:    traMADol 50 mg oral tablet  -- 0.5 tab(s) by mouth every 6 hours, As needed, Mild Pain to moderate pain (1 - 7) MDD:4 tabs/day  -- Indication: For Pain control     Edarbi 40 mg oral tablet  -- 1 tab(s) by mouth once a day  -- Indication: For HTN    gabapentin 100 mg oral capsule  -- 1 cap(s) by mouth once a day (at bedtime)  -- Indication: For Pain control     Lexapro 5 mg oral tablet  -- 1 tab(s) by mouth once a day  -- Indication: For Antidepressant     meclizine 12.5 mg oral tablet  -- 1 tab(s) by mouth 2 times a day, As Needed  -- Indication: For dizziness    Toprol-XL 50 mg oral tablet, extended release  -- 1 tab(s) by mouth once a day  -- Indication: For HTN    amLODIPine 10 mg oral tablet  -- 1 tab(s) by mouth once a day  -- Indication: For HTN    lidocaine 5% topical film  -- Apply on skin to affected area once a day MDD:1 patch/day   -- Indication: For topicalanalgesic     bisacodyl 10 mg rectal suppository  -- 1 suppository(ies) rectally once a day, As needed, Constipation  -- Indication: For Constipation     docusate sodium 100 mg oral capsule  -- 1 cap(s) by mouth 2 times a day  -- Indication: For Constipation    senna oral tablet  -- 2 tab(s) by mouth once a day (at bedtime)  -- Indication: For  constipaiton     Synthroid 100 mcg (0.1 mg) oral tablet  -- 1 tab(s) by mouth once a day  -- Indication: For Hypothyroidism    hydrALAZINE 50 mg oral tablet  -- 2 tab(s) by mouth 3 times a day  -- Indication: For HTN

## 2018-05-23 PROCEDURE — 71250 CT THORAX DX C-: CPT

## 2018-05-23 PROCEDURE — 82553 CREATINE MB FRACTION: CPT

## 2018-05-23 PROCEDURE — 82962 GLUCOSE BLOOD TEST: CPT

## 2018-05-23 PROCEDURE — 84443 ASSAY THYROID STIM HORMONE: CPT

## 2018-05-23 PROCEDURE — 82607 VITAMIN B-12: CPT

## 2018-05-23 PROCEDURE — 96374 THER/PROPH/DIAG INJ IV PUSH: CPT

## 2018-05-23 PROCEDURE — 80061 LIPID PANEL: CPT

## 2018-05-23 PROCEDURE — 70450 CT HEAD/BRAIN W/O DYE: CPT

## 2018-05-23 PROCEDURE — 84484 ASSAY OF TROPONIN QUANT: CPT

## 2018-05-23 PROCEDURE — 73030 X-RAY EXAM OF SHOULDER: CPT

## 2018-05-23 PROCEDURE — 83735 ASSAY OF MAGNESIUM: CPT

## 2018-05-23 PROCEDURE — 80048 BASIC METABOLIC PNL TOTAL CA: CPT

## 2018-05-23 PROCEDURE — 99285 EMERGENCY DEPT VISIT HI MDM: CPT | Mod: 25

## 2018-05-23 PROCEDURE — 83036 HEMOGLOBIN GLYCOSYLATED A1C: CPT

## 2018-05-23 PROCEDURE — 85027 COMPLETE CBC AUTOMATED: CPT

## 2018-05-23 PROCEDURE — 84100 ASSAY OF PHOSPHORUS: CPT

## 2018-05-23 PROCEDURE — 80053 COMPREHEN METABOLIC PANEL: CPT

## 2018-05-23 PROCEDURE — 72131 CT LUMBAR SPINE W/O DYE: CPT

## 2018-05-23 PROCEDURE — 82306 VITAMIN D 25 HYDROXY: CPT

## 2018-05-23 PROCEDURE — 82550 ASSAY OF CK (CPK): CPT

## 2018-05-23 PROCEDURE — 74019 RADEX ABDOMEN 2 VIEWS: CPT

## 2018-05-23 PROCEDURE — 93005 ELECTROCARDIOGRAM TRACING: CPT

## 2018-05-23 PROCEDURE — 97162 PT EVAL MOD COMPLEX 30 MIN: CPT

## 2018-09-05 NOTE — ED ADULT NURSE NOTE - NSSISCREENINGQ1_ED_A_ED
Patient stated he wants IV site out so that he can leave hospital. This nurse went to patient room to speak to him. Patient stated, \"I want to speak to a nurse manager. \" This nurse informed the patient that will speak to manager to have her speak with him. Patient agreed to have blood glucose check and morning pill. Medicated for vertigo. No

## 2019-07-06 ENCOUNTER — EMERGENCY (EMERGENCY)
Facility: HOSPITAL | Age: 84
LOS: 1 days | Discharge: HOME | End: 2019-07-06
Attending: EMERGENCY MEDICINE
Payer: MEDICARE

## 2019-07-06 VITALS
SYSTOLIC BLOOD PRESSURE: 147 MMHG | OXYGEN SATURATION: 95 % | WEIGHT: 139.99 LBS | TEMPERATURE: 98 F | RESPIRATION RATE: 16 BRPM | HEART RATE: 61 BPM | HEIGHT: 60 IN | DIASTOLIC BLOOD PRESSURE: 68 MMHG

## 2019-07-06 VITALS — SYSTOLIC BLOOD PRESSURE: 169 MMHG | HEART RATE: 60 BPM | DIASTOLIC BLOOD PRESSURE: 80 MMHG

## 2019-07-06 DIAGNOSIS — Z96.642 PRESENCE OF LEFT ARTIFICIAL HIP JOINT: Chronic | ICD-10-CM

## 2019-07-06 DIAGNOSIS — Z90.710 ACQUIRED ABSENCE OF BOTH CERVIX AND UTERUS: Chronic | ICD-10-CM

## 2019-07-06 PROBLEM — I10 ESSENTIAL (PRIMARY) HYPERTENSION: Chronic | Status: ACTIVE | Noted: 2017-02-06

## 2019-07-06 LAB
ALBUMIN SERPL ELPH-MCNC: 3.3 G/DL — LOW (ref 3.5–5)
ALP SERPL-CCNC: 43 U/L — SIGNIFICANT CHANGE UP (ref 40–120)
ALT FLD-CCNC: 23 U/L DA — SIGNIFICANT CHANGE UP (ref 10–60)
ANION GAP SERPL CALC-SCNC: 9 MMOL/L — SIGNIFICANT CHANGE UP (ref 5–17)
APPEARANCE UR: CLEAR — SIGNIFICANT CHANGE UP
AST SERPL-CCNC: 18 U/L — SIGNIFICANT CHANGE UP (ref 10–40)
BASOPHILS # BLD AUTO: 0.03 K/UL — SIGNIFICANT CHANGE UP (ref 0–0.2)
BASOPHILS NFR BLD AUTO: 0.4 % — SIGNIFICANT CHANGE UP (ref 0–2)
BILIRUB SERPL-MCNC: 0.2 MG/DL — SIGNIFICANT CHANGE UP (ref 0.2–1.2)
BILIRUB UR-MCNC: NEGATIVE — SIGNIFICANT CHANGE UP
BUN SERPL-MCNC: 23 MG/DL — HIGH (ref 7–18)
CALCIUM SERPL-MCNC: 8.5 MG/DL — SIGNIFICANT CHANGE UP (ref 8.4–10.5)
CHLORIDE SERPL-SCNC: 98 MMOL/L — SIGNIFICANT CHANGE UP (ref 96–108)
CO2 SERPL-SCNC: 23 MMOL/L — SIGNIFICANT CHANGE UP (ref 22–31)
COLOR SPEC: YELLOW — SIGNIFICANT CHANGE UP
CREAT SERPL-MCNC: 0.84 MG/DL — SIGNIFICANT CHANGE UP (ref 0.5–1.3)
DIFF PNL FLD: NEGATIVE — SIGNIFICANT CHANGE UP
EOSINOPHIL # BLD AUTO: 0.09 K/UL — SIGNIFICANT CHANGE UP (ref 0–0.5)
EOSINOPHIL NFR BLD AUTO: 1.2 % — SIGNIFICANT CHANGE UP (ref 0–6)
GLUCOSE SERPL-MCNC: 117 MG/DL — HIGH (ref 70–99)
GLUCOSE UR QL: NEGATIVE — SIGNIFICANT CHANGE UP
HCT VFR BLD CALC: 35.6 % — SIGNIFICANT CHANGE UP (ref 34.5–45)
HGB BLD-MCNC: 11.6 G/DL — SIGNIFICANT CHANGE UP (ref 11.5–15.5)
IMM GRANULOCYTES NFR BLD AUTO: 0.5 % — SIGNIFICANT CHANGE UP (ref 0–1.5)
KETONES UR-MCNC: NEGATIVE — SIGNIFICANT CHANGE UP
LEUKOCYTE ESTERASE UR-ACNC: ABNORMAL
LIDOCAIN IGE QN: 315 U/L — SIGNIFICANT CHANGE UP (ref 73–393)
LYMPHOCYTES # BLD AUTO: 2.15 K/UL — SIGNIFICANT CHANGE UP (ref 1–3.3)
LYMPHOCYTES # BLD AUTO: 29.3 % — SIGNIFICANT CHANGE UP (ref 13–44)
MCHC RBC-ENTMCNC: 26.2 PG — LOW (ref 27–34)
MCHC RBC-ENTMCNC: 32.6 GM/DL — SIGNIFICANT CHANGE UP (ref 32–36)
MCV RBC AUTO: 80.4 FL — SIGNIFICANT CHANGE UP (ref 80–100)
MONOCYTES # BLD AUTO: 0.47 K/UL — SIGNIFICANT CHANGE UP (ref 0–0.9)
MONOCYTES NFR BLD AUTO: 6.4 % — SIGNIFICANT CHANGE UP (ref 2–14)
NEUTROPHILS # BLD AUTO: 4.57 K/UL — SIGNIFICANT CHANGE UP (ref 1.8–7.4)
NEUTROPHILS NFR BLD AUTO: 62.2 % — SIGNIFICANT CHANGE UP (ref 43–77)
NITRITE UR-MCNC: POSITIVE
NRBC # BLD: 0 /100 WBCS — SIGNIFICANT CHANGE UP (ref 0–0)
PH UR: 5 — SIGNIFICANT CHANGE UP (ref 5–8)
PLATELET # BLD AUTO: 190 K/UL — SIGNIFICANT CHANGE UP (ref 150–400)
POTASSIUM SERPL-MCNC: 4 MMOL/L — SIGNIFICANT CHANGE UP (ref 3.5–5.3)
POTASSIUM SERPL-SCNC: 4 MMOL/L — SIGNIFICANT CHANGE UP (ref 3.5–5.3)
PROT SERPL-MCNC: 7.5 G/DL — SIGNIFICANT CHANGE UP (ref 6–8.3)
PROT UR-MCNC: NEGATIVE — SIGNIFICANT CHANGE UP
RBC # BLD: 4.43 M/UL — SIGNIFICANT CHANGE UP (ref 3.8–5.2)
RBC # FLD: 13 % — SIGNIFICANT CHANGE UP (ref 10.3–14.5)
SODIUM SERPL-SCNC: 130 MMOL/L — LOW (ref 135–145)
SP GR SPEC: 1.01 — SIGNIFICANT CHANGE UP (ref 1.01–1.02)
TROPONIN I SERPL-MCNC: <0.015 NG/ML — SIGNIFICANT CHANGE UP (ref 0–0.04)
UROBILINOGEN FLD QL: NEGATIVE — SIGNIFICANT CHANGE UP
WBC # BLD: 7.35 K/UL — SIGNIFICANT CHANGE UP (ref 3.8–10.5)
WBC # FLD AUTO: 7.35 K/UL — SIGNIFICANT CHANGE UP (ref 3.8–10.5)

## 2019-07-06 PROCEDURE — 96361 HYDRATE IV INFUSION ADD-ON: CPT

## 2019-07-06 PROCEDURE — 70450 CT HEAD/BRAIN W/O DYE: CPT

## 2019-07-06 PROCEDURE — 87086 URINE CULTURE/COLONY COUNT: CPT

## 2019-07-06 PROCEDURE — 99284 EMERGENCY DEPT VISIT MOD MDM: CPT | Mod: 25

## 2019-07-06 PROCEDURE — 85027 COMPLETE CBC AUTOMATED: CPT

## 2019-07-06 PROCEDURE — 83690 ASSAY OF LIPASE: CPT

## 2019-07-06 PROCEDURE — 81001 URINALYSIS AUTO W/SCOPE: CPT

## 2019-07-06 PROCEDURE — 84484 ASSAY OF TROPONIN QUANT: CPT

## 2019-07-06 PROCEDURE — 87186 SC STD MICRODIL/AGAR DIL: CPT

## 2019-07-06 PROCEDURE — 99284 EMERGENCY DEPT VISIT MOD MDM: CPT

## 2019-07-06 PROCEDURE — 80053 COMPREHEN METABOLIC PANEL: CPT

## 2019-07-06 PROCEDURE — 36415 COLL VENOUS BLD VENIPUNCTURE: CPT

## 2019-07-06 PROCEDURE — 96360 HYDRATION IV INFUSION INIT: CPT

## 2019-07-06 PROCEDURE — 70450 CT HEAD/BRAIN W/O DYE: CPT | Mod: 26

## 2019-07-06 RX ORDER — AZTREONAM 2 G
1 VIAL (EA) INJECTION
Qty: 6 | Refills: 0
Start: 2019-07-06 | End: 2019-07-08

## 2019-07-06 RX ORDER — SODIUM CHLORIDE 9 MG/ML
1000 INJECTION INTRAMUSCULAR; INTRAVENOUS; SUBCUTANEOUS ONCE
Refills: 0 | Status: COMPLETED | OUTPATIENT
Start: 2019-07-06 | End: 2019-07-06

## 2019-07-06 RX ADMIN — Medication 1 TABLET(S): at 19:00

## 2019-07-06 RX ADMIN — SODIUM CHLORIDE 4000 MILLILITER(S): 9 INJECTION INTRAMUSCULAR; INTRAVENOUS; SUBCUTANEOUS at 15:59

## 2019-07-06 RX ADMIN — SODIUM CHLORIDE 1000 MILLILITER(S): 9 INJECTION INTRAMUSCULAR; INTRAVENOUS; SUBCUTANEOUS at 18:04

## 2019-07-06 NOTE — ED PROVIDER NOTE - PROGRESS NOTE DETAILS
continues to be asymptomatic. ua positive, pt pnc allergic. daughter is a physician, and lives with pt. will treat with bactrim. bp elevated, pt skipped her mid day hydralazine and toprol while in the ED. Allowed her to take her home dose of hydralazine but advised to skip toprol for now bc HR 60. Offered admission, pt and daughter declined. printed and gave and discussed results. lasix might be causing decrease in sodium. pt also dehydrated. discussed anticipatory guidance, return precautions such as worsening weakness, dizziness, fever or any new or concerning sx.

## 2019-07-06 NOTE — ED PROVIDER NOTE - NEUROLOGICAL, MLM
Alert and oriented, no focal deficits, no motor or sensory deficits. No trouble with coordination, normal finger to nose, walking with assistance. no nysgagmus.

## 2019-07-06 NOTE — ED ADULT NURSE NOTE - NSIMPLEMENTINTERV_GEN_ALL_ED
Implemented All Fall with Harm Risk Interventions:  [place] to call system. Call bell, personal items and telephone within reach. Instruct patient to call for assistance. Room bathroom lighting operational. Non-slip footwear when patient is off stretcher. Physically safe environment: no spills, clutter or unnecessary equipment. Stretcher in lowest position, wheels locked, appropriate side rails in place. Provide visual cue, wrist band, yellow gown, etc. Monitor gait and stability. Monitor for mental status changes and reorient to person, place, and time. Review medications for side effects contributing to fall risk. Reinforce activity limits and safety measures with patient and family. Provide visual clues: red socks.

## 2019-07-06 NOTE — ED PROVIDER NOTE - CARE PLAN
Principal Discharge DX:	UTI (urinary tract infection)  Secondary Diagnosis:	Dehydration  Secondary Diagnosis:	Hyponatremia

## 2019-07-06 NOTE — ED PROVIDER NOTE - OBJECTIVE STATEMENT
86F with h/o HTN, hypothyroidism, depression, BPPV on meclizine, HCV (treated possibly in remission), hysterectomy, left hip replacement, frequent falls, uses walker at baseline, has HHA x 9 hrs 6d/w, brought in by her daughter Laurie this am she checked her bp as she does daily and it was 88/37. She checked it again ant it was 91/40. On July 4th, she had some dizziness/vertigo, but it resolved on the same day. No headache, fever or chills. No cp or sob. Feels at her baseline now. Daughter is concerned about her electrolytes because 2 weeks ago she was started on lasix 40 mg bid. Also taking toprol bid and hydralazine tid for htn.

## 2019-07-06 NOTE — ED ADULT NURSE NOTE - OBJECTIVE STATEMENT
BIB EMS from home with c/o generalized weakness , as per daughter report Pt had episode of dizziness and vomiting 3 days ago BIB EMS from home with c/o generalized weakness , as per daughter report Pt had episode of dizziness and vomiting 3 days, but it resolved on the same day. No headache, fever / chills chest pain SOB noted

## 2019-07-06 NOTE — ED PROVIDER NOTE - CLINICAL SUMMARY MEDICAL DECISION MAKING FREE TEXT BOX
86F with h/o HTN, hypothyroidism, depression, BPPV on meclizine, HCV (treated possibly in remission), hysterectomy, left hip replacement, frequent falls, uses walker at baseline, has HHA x 9 hrs 6d/w, brought in by her daughter Laurie this am she checked her bp as she does daily and it was 88/37. Pt is asymptomatic. Benign exam. Will check electrolytes, screen for infection, do ct head and reassess.   see progress note

## 2019-07-08 LAB
-  AMIKACIN: SIGNIFICANT CHANGE UP
-  AMPICILLIN/SULBACTAM: SIGNIFICANT CHANGE UP
-  AMPICILLIN: SIGNIFICANT CHANGE UP
-  AZTREONAM: SIGNIFICANT CHANGE UP
-  CEFEPIME: SIGNIFICANT CHANGE UP
-  CEFOXITIN: SIGNIFICANT CHANGE UP
-  CEFTRIAXONE: SIGNIFICANT CHANGE UP
-  CIPROFLOXACIN: SIGNIFICANT CHANGE UP
-  ERTAPENEM: SIGNIFICANT CHANGE UP
-  GENTAMICIN: SIGNIFICANT CHANGE UP
-  IMIPENEM: SIGNIFICANT CHANGE UP
-  LEVOFLOXACIN: SIGNIFICANT CHANGE UP
-  MEROPENEM: SIGNIFICANT CHANGE UP
-  NITROFURANTOIN: SIGNIFICANT CHANGE UP
-  PIPERACILLIN/TAZOBACTAM: SIGNIFICANT CHANGE UP
-  TIGECYCLINE: SIGNIFICANT CHANGE UP
-  TOBRAMYCIN: SIGNIFICANT CHANGE UP
-  TRIMETHOPRIM/SULFAMETHOXAZOLE: SIGNIFICANT CHANGE UP
CULTURE RESULTS: SIGNIFICANT CHANGE UP
METHOD TYPE: SIGNIFICANT CHANGE UP
ORGANISM # SPEC MICROSCOPIC CNT: SIGNIFICANT CHANGE UP
ORGANISM # SPEC MICROSCOPIC CNT: SIGNIFICANT CHANGE UP
SPECIMEN SOURCE: SIGNIFICANT CHANGE UP

## 2020-02-06 PROBLEM — Z00.00 ENCOUNTER FOR PREVENTIVE HEALTH EXAMINATION: Status: ACTIVE | Noted: 2020-02-06

## 2020-02-12 ENCOUNTER — APPOINTMENT (OUTPATIENT)
Dept: SPINE | Facility: CLINIC | Age: 85
End: 2020-02-12
Payer: MEDICARE

## 2020-02-12 VITALS
WEIGHT: 132 LBS | SYSTOLIC BLOOD PRESSURE: 148 MMHG | OXYGEN SATURATION: 96 % | HEART RATE: 62 BPM | TEMPERATURE: 97.9 F | DIASTOLIC BLOOD PRESSURE: 76 MMHG | BODY MASS INDEX: 25.91 KG/M2 | HEIGHT: 60 IN

## 2020-02-12 DIAGNOSIS — Z86.39 PERSONAL HISTORY OF OTHER ENDOCRINE, NUTRITIONAL AND METABOLIC DISEASE: ICD-10-CM

## 2020-02-12 DIAGNOSIS — Z80.0 FAMILY HISTORY OF MALIGNANT NEOPLASM OF DIGESTIVE ORGANS: ICD-10-CM

## 2020-02-12 DIAGNOSIS — G91.2 (IDIOPATHIC) NORMAL PRESSURE HYDROCEPHALUS: ICD-10-CM

## 2020-02-12 DIAGNOSIS — Z86.69 PERSONAL HISTORY OF OTHER DISEASES OF THE NERVOUS SYSTEM AND SENSE ORGANS: ICD-10-CM

## 2020-02-12 PROCEDURE — 99204 OFFICE O/P NEW MOD 45 MIN: CPT

## 2020-02-12 RX ORDER — METOPROLOL TARTRATE 100 MG/1
100 TABLET, FILM COATED ORAL
Refills: 0 | Status: ACTIVE | COMMUNITY

## 2020-02-12 RX ORDER — LEVOTHYROXINE SODIUM 137 UG/1
137 TABLET ORAL
Refills: 0 | Status: ACTIVE | COMMUNITY

## 2020-02-12 RX ORDER — AZILSARTAN KAMEDOXOMIL 40 MG/1
40 TABLET ORAL
Refills: 0 | Status: ACTIVE | COMMUNITY

## 2020-02-12 RX ORDER — AMLODIPINE BESYLATE 10 MG/1
10 TABLET ORAL
Refills: 0 | Status: ACTIVE | COMMUNITY

## 2020-02-12 RX ORDER — HYDRALAZINE HYDROCHLORIDE 50 MG/1
50 TABLET ORAL
Refills: 0 | Status: ACTIVE | COMMUNITY

## 2020-02-12 RX ORDER — MEMANTINE HYDROCHLORIDE 7 MG/1
7 CAPSULE, EXTENDED RELEASE ORAL
Refills: 0 | Status: ACTIVE | COMMUNITY

## 2020-02-13 PROBLEM — G91.2 NPH (NORMAL PRESSURE HYDROCEPHALUS): Status: ACTIVE | Noted: 2020-02-13

## 2020-02-19 ENCOUNTER — INPATIENT (INPATIENT)
Facility: HOSPITAL | Age: 85
LOS: 1 days | Discharge: HOME | DRG: 689 | End: 2020-02-21
Attending: GENERAL PRACTICE | Admitting: GENERAL PRACTICE
Payer: MEDICARE

## 2020-02-19 VITALS
SYSTOLIC BLOOD PRESSURE: 145 MMHG | HEIGHT: 64 IN | TEMPERATURE: 98 F | OXYGEN SATURATION: 98 % | DIASTOLIC BLOOD PRESSURE: 70 MMHG | WEIGHT: 175.05 LBS | HEART RATE: 74 BPM | RESPIRATION RATE: 18 BRPM

## 2020-02-19 DIAGNOSIS — Z90.710 ACQUIRED ABSENCE OF BOTH CERVIX AND UTERUS: Chronic | ICD-10-CM

## 2020-02-19 DIAGNOSIS — Z96.642 PRESENCE OF LEFT ARTIFICIAL HIP JOINT: Chronic | ICD-10-CM

## 2020-02-19 DIAGNOSIS — E87.1 HYPO-OSMOLALITY AND HYPONATREMIA: ICD-10-CM

## 2020-02-19 LAB
ALBUMIN SERPL ELPH-MCNC: 3.4 G/DL — SIGNIFICANT CHANGE UP (ref 3.3–5)
ALP SERPL-CCNC: 136 U/L — HIGH (ref 40–120)
ALT FLD-CCNC: 52 U/L — HIGH (ref 10–45)
ANION GAP SERPL CALC-SCNC: 14 MMOL/L — SIGNIFICANT CHANGE UP (ref 5–17)
APPEARANCE UR: ABNORMAL
AST SERPL-CCNC: 55 U/L — HIGH (ref 10–40)
BACTERIA # UR AUTO: ABNORMAL
BASOPHILS # BLD AUTO: 0.01 K/UL — SIGNIFICANT CHANGE UP (ref 0–0.2)
BASOPHILS NFR BLD AUTO: 0.1 % — SIGNIFICANT CHANGE UP (ref 0–2)
BILIRUB SERPL-MCNC: 0.4 MG/DL — SIGNIFICANT CHANGE UP (ref 0.2–1.2)
BILIRUB UR-MCNC: NEGATIVE — SIGNIFICANT CHANGE UP
BUN SERPL-MCNC: 22 MG/DL — SIGNIFICANT CHANGE UP (ref 7–23)
CALCIUM SERPL-MCNC: 9.3 MG/DL — SIGNIFICANT CHANGE UP (ref 8.4–10.5)
CHLORIDE SERPL-SCNC: 91 MMOL/L — LOW (ref 96–108)
CO2 SERPL-SCNC: 21 MMOL/L — LOW (ref 22–31)
COLOR SPEC: YELLOW — SIGNIFICANT CHANGE UP
CREAT ?TM UR-MCNC: 102 MG/DL — SIGNIFICANT CHANGE UP
CREAT SERPL-MCNC: 0.78 MG/DL — SIGNIFICANT CHANGE UP (ref 0.5–1.3)
DIFF PNL FLD: NEGATIVE — SIGNIFICANT CHANGE UP
EOSINOPHIL # BLD AUTO: 0.04 K/UL — SIGNIFICANT CHANGE UP (ref 0–0.5)
EOSINOPHIL NFR BLD AUTO: 0.5 % — SIGNIFICANT CHANGE UP (ref 0–6)
EPI CELLS # UR: 3 — SIGNIFICANT CHANGE UP
GAS PNL BLDV: SIGNIFICANT CHANGE UP
GLUCOSE SERPL-MCNC: 119 MG/DL — HIGH (ref 70–99)
GLUCOSE UR QL: NEGATIVE — SIGNIFICANT CHANGE UP
HCT VFR BLD CALC: 34.1 % — LOW (ref 34.5–45)
HGB BLD-MCNC: 11 G/DL — LOW (ref 11.5–15.5)
HYALINE CASTS # UR AUTO: 0 /LPF — SIGNIFICANT CHANGE UP (ref 0–7)
IMM GRANULOCYTES NFR BLD AUTO: 0.4 % — SIGNIFICANT CHANGE UP (ref 0–1.5)
KETONES UR-MCNC: NEGATIVE — SIGNIFICANT CHANGE UP
LEUKOCYTE ESTERASE UR-ACNC: ABNORMAL
LIDOCAIN IGE QN: 44 U/L — SIGNIFICANT CHANGE UP (ref 7–60)
LYMPHOCYTES # BLD AUTO: 1.23 K/UL — SIGNIFICANT CHANGE UP (ref 1–3.3)
LYMPHOCYTES # BLD AUTO: 15.4 % — SIGNIFICANT CHANGE UP (ref 13–44)
MCHC RBC-ENTMCNC: 26.1 PG — LOW (ref 27–34)
MCHC RBC-ENTMCNC: 32.3 GM/DL — SIGNIFICANT CHANGE UP (ref 32–36)
MCV RBC AUTO: 80.8 FL — SIGNIFICANT CHANGE UP (ref 80–100)
MONOCYTES # BLD AUTO: 0.79 K/UL — SIGNIFICANT CHANGE UP (ref 0–0.9)
MONOCYTES NFR BLD AUTO: 9.9 % — SIGNIFICANT CHANGE UP (ref 2–14)
NEUTROPHILS # BLD AUTO: 5.87 K/UL — SIGNIFICANT CHANGE UP (ref 1.8–7.4)
NEUTROPHILS NFR BLD AUTO: 73.7 % — SIGNIFICANT CHANGE UP (ref 43–77)
NITRITE UR-MCNC: POSITIVE
NRBC # BLD: 0 /100 WBCS — SIGNIFICANT CHANGE UP (ref 0–0)
OSMOLALITY UR: 500 MOS/KG — SIGNIFICANT CHANGE UP (ref 300–900)
PH UR: 6 — SIGNIFICANT CHANGE UP (ref 5–8)
PLATELET # BLD AUTO: 228 K/UL — SIGNIFICANT CHANGE UP (ref 150–400)
POTASSIUM SERPL-MCNC: 4.6 MMOL/L — SIGNIFICANT CHANGE UP (ref 3.5–5.3)
POTASSIUM SERPL-SCNC: 4.6 MMOL/L — SIGNIFICANT CHANGE UP (ref 3.5–5.3)
PROT SERPL-MCNC: 8.3 G/DL — SIGNIFICANT CHANGE UP (ref 6–8.3)
PROT UR-MCNC: ABNORMAL
RBC # BLD: 4.22 M/UL — SIGNIFICANT CHANGE UP (ref 3.8–5.2)
RBC # FLD: 13.3 % — SIGNIFICANT CHANGE UP (ref 10.3–14.5)
RBC CASTS # UR COMP ASSIST: 3 /HPF — SIGNIFICANT CHANGE UP (ref 0–4)
SODIUM SERPL-SCNC: 126 MMOL/L — LOW (ref 135–145)
SODIUM UR-SCNC: 35 MMOL/L — SIGNIFICANT CHANGE UP
SP GR SPEC: 1.02 — SIGNIFICANT CHANGE UP (ref 1.01–1.02)
TSH SERPL-MCNC: 0.73 UIU/ML — SIGNIFICANT CHANGE UP (ref 0.27–4.2)
UROBILINOGEN FLD QL: NEGATIVE — SIGNIFICANT CHANGE UP
WBC # BLD: 7.97 K/UL — SIGNIFICANT CHANGE UP (ref 3.8–10.5)
WBC # FLD AUTO: 7.97 K/UL — SIGNIFICANT CHANGE UP (ref 3.8–10.5)
WBC UR QL: 13 /HPF — HIGH (ref 0–5)

## 2020-02-19 PROCEDURE — 70450 CT HEAD/BRAIN W/O DYE: CPT | Mod: 26

## 2020-02-19 PROCEDURE — 74177 CT ABD & PELVIS W/CONTRAST: CPT | Mod: 26

## 2020-02-19 PROCEDURE — 99285 EMERGENCY DEPT VISIT HI MDM: CPT | Mod: GC

## 2020-02-19 PROCEDURE — 99222 1ST HOSP IP/OBS MODERATE 55: CPT

## 2020-02-19 PROCEDURE — 93010 ELECTROCARDIOGRAM REPORT: CPT

## 2020-02-19 RX ORDER — LIPASE/PROTEASE/AMYLASE 16-48-48K
2 CAPSULE,DELAYED RELEASE (ENTERIC COATED) ORAL
Qty: 0 | Refills: 0 | DISCHARGE

## 2020-02-19 RX ORDER — LEVOTHYROXINE SODIUM 125 MCG
1 TABLET ORAL
Qty: 0 | Refills: 0 | DISCHARGE

## 2020-02-19 RX ORDER — HYDRALAZINE HCL 50 MG
100 TABLET ORAL
Refills: 0 | Status: DISCONTINUED | OUTPATIENT
Start: 2020-02-19 | End: 2020-02-21

## 2020-02-19 RX ORDER — MEMANTINE HYDROCHLORIDE 10 MG/1
7 TABLET ORAL DAILY
Refills: 0 | Status: DISCONTINUED | OUTPATIENT
Start: 2020-02-19 | End: 2020-02-21

## 2020-02-19 RX ORDER — AZILSARTAN KAMEDOXOMIL 40 MG/1
1 TABLET ORAL
Qty: 0 | Refills: 0 | DISCHARGE

## 2020-02-19 RX ORDER — ICOSAPENT ETHYL 500 MG/1
2 CAPSULE, LIQUID FILLED ORAL
Qty: 0 | Refills: 0 | DISCHARGE

## 2020-02-19 RX ORDER — ERGOCALCIFEROL 1.25 MG/1
1 CAPSULE ORAL
Qty: 0 | Refills: 0 | DISCHARGE

## 2020-02-19 RX ORDER — METOPROLOL TARTRATE 50 MG
100 TABLET ORAL DAILY
Refills: 0 | Status: DISCONTINUED | OUTPATIENT
Start: 2020-02-19 | End: 2020-02-21

## 2020-02-19 RX ORDER — HYDRALAZINE HCL 50 MG
50 TABLET ORAL DAILY
Refills: 0 | Status: DISCONTINUED | OUTPATIENT
Start: 2020-02-19 | End: 2020-02-21

## 2020-02-19 RX ORDER — SODIUM CHLORIDE 9 MG/ML
1000 INJECTION INTRAMUSCULAR; INTRAVENOUS; SUBCUTANEOUS
Refills: 0 | Status: DISCONTINUED | OUTPATIENT
Start: 2020-02-19 | End: 2020-02-21

## 2020-02-19 RX ORDER — AZILSARTAN KAMEDOXOMIL 40 MG/1
40 TABLET ORAL EVERY 24 HOURS
Refills: 0 | Status: DISCONTINUED | OUTPATIENT
Start: 2020-02-19 | End: 2020-02-21

## 2020-02-19 RX ORDER — MECLIZINE HCL 12.5 MG
1 TABLET ORAL
Qty: 0 | Refills: 0 | DISCHARGE

## 2020-02-19 RX ORDER — RIVAROXABAN 15 MG-20MG
10 KIT ORAL DAILY
Refills: 0 | Status: DISCONTINUED | OUTPATIENT
Start: 2020-02-19 | End: 2020-02-21

## 2020-02-19 RX ORDER — CEFTRIAXONE 500 MG/1
1000 INJECTION, POWDER, FOR SOLUTION INTRAMUSCULAR; INTRAVENOUS ONCE
Refills: 0 | Status: COMPLETED | OUTPATIENT
Start: 2020-02-19 | End: 2020-02-19

## 2020-02-19 RX ORDER — ESCITALOPRAM OXALATE 10 MG/1
1 TABLET, FILM COATED ORAL
Qty: 0 | Refills: 0 | DISCHARGE

## 2020-02-19 RX ORDER — LEVOTHYROXINE SODIUM 125 MCG
125 TABLET ORAL
Refills: 0 | Status: DISCONTINUED | OUTPATIENT
Start: 2020-02-19 | End: 2020-02-21

## 2020-02-19 RX ORDER — TRAMADOL HYDROCHLORIDE 50 MG/1
25 TABLET ORAL THREE TIMES A DAY
Refills: 0 | Status: DISCONTINUED | OUTPATIENT
Start: 2020-02-19 | End: 2020-02-21

## 2020-02-19 RX ORDER — LIDOCAINE 4 G/100G
1 CREAM TOPICAL
Qty: 7 | Refills: 0

## 2020-02-19 RX ORDER — SENNA PLUS 8.6 MG/1
2 TABLET ORAL
Qty: 0 | Refills: 0 | DISCHARGE

## 2020-02-19 RX ORDER — HYDRALAZINE HCL 50 MG
2 TABLET ORAL
Qty: 0 | Refills: 0 | DISCHARGE

## 2020-02-19 RX ORDER — IBUPROFEN 200 MG
0 TABLET ORAL
Qty: 0 | Refills: 0 | DISCHARGE

## 2020-02-19 RX ORDER — ESCITALOPRAM OXALATE 10 MG/1
10 TABLET, FILM COATED ORAL DAILY
Refills: 0 | Status: DISCONTINUED | OUTPATIENT
Start: 2020-02-19 | End: 2020-02-21

## 2020-02-19 RX ORDER — MECLIZINE HCL 12.5 MG
12.5 TABLET ORAL DAILY
Refills: 0 | Status: DISCONTINUED | OUTPATIENT
Start: 2020-02-19 | End: 2020-02-21

## 2020-02-19 RX ORDER — IBANDRONATE SODIUM 150 MG/1
1 TABLET ORAL
Qty: 0 | Refills: 0 | DISCHARGE

## 2020-02-19 RX ORDER — CEFTRIAXONE 500 MG/1
1000 INJECTION, POWDER, FOR SOLUTION INTRAMUSCULAR; INTRAVENOUS EVERY 24 HOURS
Refills: 0 | Status: DISCONTINUED | OUTPATIENT
Start: 2020-02-19 | End: 2020-02-21

## 2020-02-19 RX ORDER — METOPROLOL TARTRATE 50 MG
1 TABLET ORAL
Qty: 0 | Refills: 0 | DISCHARGE

## 2020-02-19 RX ORDER — AMLODIPINE BESYLATE 2.5 MG/1
1 TABLET ORAL
Qty: 0 | Refills: 0 | DISCHARGE

## 2020-02-19 RX ADMIN — CEFTRIAXONE 100 MILLIGRAM(S): 500 INJECTION, POWDER, FOR SOLUTION INTRAMUSCULAR; INTRAVENOUS at 17:42

## 2020-02-19 RX ADMIN — Medication 100 MILLIGRAM(S): at 22:21

## 2020-02-19 RX ADMIN — AZILSARTAN KAMEDOXOMIL 40 MILLIGRAM(S): 40 TABLET ORAL at 22:22

## 2020-02-19 NOTE — ED ADULT NURSE NOTE - OBJECTIVE STATEMENT
Female 89 years old of HTN, and hypothyroidism, understands and speaks only [place], was brought in by EMS for increasing weakness and lethargy since Saturday. As per daughter pt was seen by PCP yesterday and had blood works done and was called today to come to ER for low sodium. Had vomiting and diarrhea Saturday only. As per daughter pt has increasing difficulty of walking and confusion for months. Denies chest pain, sob, fever and chills. Had history of UTI. Safety maintained. Will continue to reassess.

## 2020-02-19 NOTE — CONSULT NOTE ADULT - SUBJECTIVE AND OBJECTIVE BOX
p ([id number])     HPI: 89yo F pmhx HTN hypothyroidism, [place] speaking, daughter at bedside to provide translation presents for abd pain and hyponatremia on outpatient labs. Per daughter, since Saturday pt. has been urinating on herself frequently and has been more weak and lethargic than usual, she saw her granddaughter as an oupt yesterday who is a doctor and lebron labs significant for a sodium of 126. Per daughter, pt. has had progressive difficulty with walking for months and worsening confusion. Pt. has had urinary incontinence in the past when she had a UTI. No recent falls. No back pain. Denies HA fever chills cp sob n/v/d numbness/tingling.      Imaging:    Exam: AAOx2 (family translating), FC, MUJICA 5/5 strength, SILT    --Anticoagulation:    =====================  PAST MEDICAL HISTORY   Hepatitis C virus infection, unspecified chronicity  Dizziness  HTN (hypertension)    PAST SURGICAL HISTORY   H/O abdominal hysterectomy  Status post left hip replacement  No significant past surgical history    famotidine (Unknown)  penicillin (Rash)  Tagamet (Unknown)  Tylenol (Unknown)  Zantac (Unknown)      MEDICATIONS:  Antibiotics:    Neuro:    Other:      SOCIAL HISTORY:   Occupation:   Marital Status:     FAMILY HISTORY:  Family history of leukemia (Sibling)  Family history of diabetes mellitus (Sibling)  Family history of colon cancer in mother      ROS: Negative except per HPI    LABS:                          11.0   7.97  )-----------( 228      ( 19 Feb 2020 14:44 )             34.1

## 2020-02-19 NOTE — ED ADULT NURSE NOTE - NSIMPLEMENTINTERV_GEN_ALL_ED
Implemented All Fall Risk Interventions:  [place] to call system. Call bell, personal items and telephone within reach. Instruct patient to call for assistance. Room bathroom lighting operational. Non-slip footwear when patient is off stretcher. Physically safe environment: no spills, clutter or unnecessary equipment. Stretcher in lowest position, wheels locked, appropriate side rails in place. Provide visual cue, wrist band, yellow gown, etc. Monitor gait and stability. Monitor for mental status changes and reorient to person, place, and time. Review medications for side effects contributing to fall risk. Reinforce activity limits and safety measures with patient and family.

## 2020-02-19 NOTE — H&P ADULT - NSICDXPASTMEDICALHX_GEN_ALL_CORE_FT
PAST MEDICAL HISTORY:  Dementia     Dizziness     Hepatitis C virus infection, unspecified chronicity s/p treatment w post treatment virus load at 0, as per daughter    HTN (hypertension)     NPH (normal pressure hydrocephalus)

## 2020-02-19 NOTE — ED PROVIDER NOTE - CARE PLAN
Principal Discharge DX:	Hyponatremia  Secondary Diagnosis:	UTI (urinary tract infection) Principal Discharge DX:	Hyponatremia  Secondary Diagnosis:	UTI (urinary tract infection)  Secondary Diagnosis:	Liver mass

## 2020-02-19 NOTE — CONSULT NOTE ADULT - SUBJECTIVE AND OBJECTIVE BOX
HPI: 89 yo woman who presents to [facility] on referral by her neurosurgeon, Dr. Slaughter, for suprapubic pain with outpatient showing hyperkalemia (5.4), hyponatremia (120s), + ISIS Es (3+). ROS also revealed recurrent hx of UTIs since last September with subsequent traumatic falls and periods of confusion. As per daughter, patient's symptoms usually improve with resolution of UTI with some residual slowing of gross motor movements. Patient has not had a chance to come completely back to her baseline (full independent ADLs) with new UTIs every 1.5 to 2 months (patient has been on Cipro, Macrobid, Bactrim in the past).  Other ROS includes periods of urinary retention, incontinence, otherwise unremarkable for LOC, chest pain, SOB, N/V, loss of bowel, focal weakness, numbness, tingling. Patient has been seeing neurosurgery outpatient since her traumatic fall in September 2019 resulting in lumbar fracture. Most recent MRI of the brain performed did not show any definitive evidence of NPH, a diagnosis being worked up given her gait, urinary symptoms, and periodic confusion. However, it is unclear at this time due to her recent infectious/metabolic concerns (UTI, electrolyte abnormalities). Patient was recently taken of her home medication, Spironolactone, due to her most recent labs (high K). Patient is due for a diagnostic LP in March. Pertinent hx includes hx of Hepatitis C virus infection, unspecified chronicity, hypothyroidism, HTN, s/p left hip replacement. Neurology consulted for further evaluation.     (Stroke only)  NIHSS:   MRS:   ICH:     REVIEW OF SYSTEMS  General:	  Skin/Breast:	  Ophthalmologic:  ENMT:	  Respiratory and Thorax:	  Cardiovascular:	  Gastrointestinal:	  Genitourinary:	  Musculoskeletal:	  Neurological:	  Psychiatric:	  Hematology/Lymphatics:	  Endocrine:	  Allergic/Immunologic:	    A 10-system ROS was performed and is negative except for those items noted above and/or in the HPI.    PAST MEDICAL & SURGICAL HISTORY:  hx of recurrent UTI w/ subsequent falls & confusion   hx of Hepatitis C virus infection, unspecified chronicity  hypothyroidism   Dizziness  HTN  s/p abdominal hysterectomy  s/p left hip replacement    FAMILY HISTORY:  Family history of leukemia (Sibling)  Family history of diabetes mellitus (Sibling)  Family history of colon cancer in mother    SOCIAL HISTORY:   T/E/D:   Occupation:   Lives with:     MEDICATIONS (HOME):  Home Medications:  amLODIPine 10 mg oral tablet: 1 tab(s) orally once a day (07 May 2018 11:04)  Edarbi 40 mg oral tablet: 1 tab(s) orally once a day (03 May 2018 17:16)  hydrALAZINE 50 mg oral tablet: 2 tab(s) orally 3 times a day (07 May 2018 11:04)  Lexapro 5 mg oral tablet: 1 tab(s) orally once a day (03 May 2018 17:16)  meclizine 12.5 mg oral tablet: 1 tab(s) orally 2 times a day, As Needed (03 May 2018 17:16)  Synthroid 100 mcg (0.1 mg) oral tablet: 1 tab(s) orally once a day (03 May 2018 17:16)  Toprol-XL 50 mg oral tablet, extended release: 1 tab(s) orally once a day (03 May 2018 17:16)    MEDICATIONS  (STANDING):    MEDICATIONS  (PRN):    ALLERGIES/INTOLERANCES:  Allergies  famotidine (Unknown)  penicillin (Rash)  Tagamet (Unknown)  Tylenol (Unknown)  Zantac (Unknown)    Intolerances    VITALS & EXAMINATION:  Vital Signs Last 24 Hrs  T(C): 36.7 (19 Feb 2020 14:29), Max: 36.8 (19 Feb 2020 13:30)  T(F): 98 (19 Feb 2020 14:29), Max: 98.3 (19 Feb 2020 13:30)  HR: 67 (19 Feb 2020 14:29) (67 - 74)  BP: 157/56 (19 Feb 2020 14:29) (145/70 - 157/56)  BP(mean): --  RR: 20 (19 Feb 2020 14:29) (18 - 20)  SpO2: 99% (19 Feb 2020 14:29) (98% - 99%)    General:  Constitutional: Female, appears stated age, in no apparent distress including pain  Head: Normocephalic  Extremities: No cyanosis, clubbing, or edema.    Normal capillary beds refill, 1-2 seconds or less.     Neurological (>12):  MS: Awake, alert, oriented to person, place, situation, not time. Normal affect. Follows all commands.    Language: Speech is clear, fluent in [place] with good comprehension (able to name objects)    CNs: PERRLA (R = 3mm, L = 3mm). VFF. EOMI no nystagmus, V1-3 intact to LT. No facial asymmetry b/l, Hearing grossly normal b/l. Symmetric palate elevation in midline. Gag reflex deferred. Tongue midline,     Fundoscopic: deferred     Motor: Normal muscle bulk & tone. No noticeable tremor. No pronator drift. 4+/5 in all extremities       Sensation: Intact to LT throughout, good proprioception w/ toe movements        Reflexes:              Biceps(C5)       BR(C6)     Triceps(C7)               Patellar(L4)    Achilles(S1)    Plantar Resp  R	2	          2	             2		        2		    2		Down   L	2	          2	             2		        2		    2		Down     Coordination:. No dysmetria to FTN    Gait: deferred     LABORATORY:  CBC                       11.0   7.97  )-----------( 228      ( 19 Feb 2020 14:44 )             34.1     Chem 02-19    126<L>  |  91<L>  |  22  ----------------------------<  119<H>  4.6   |  21<L>  |  0.78    Ca    9.3      19 Feb 2020 14:44    TPro  8.3  /  Alb  3.4  /  TBili  0.4  /  DBili  x   /  AST  55<H>  /  ALT  52<H>  /  AlkPhos  136<H>  02-19    LFTs LIVER FUNCTIONS - ( 19 Feb 2020 14:44 )  Alb: 3.4 g/dL / Pro: 8.3 g/dL / ALK PHOS: 136 U/L / ALT: 52 U/L / AST: 55 U/L / GGT: x           Coagulopathy   Lipid Panel   A1c   Cardiac enzymes     U/A   CSF  Immunological  Other    STUDIES & IMAGING:  Studies (EKG, EEG, EMG, etc):     Radiology (XR, CT, MR, U/S, TTE/RUFUS):

## 2020-02-19 NOTE — CONSULT NOTE ADULT - ASSESSMENT
88 yo woman who presents to [facility] on referral by her neurosurgeon, Dr. Slaughter, for suprapubic pain with outpatient showing hyperkalemia (5.4), hyponatremia (120s), + ISIS Es (3+). ROS also revealed recurrent hx of UTIs since last September with subsequent traumatic falls and periods of confusion. As per daughter, patient's symptoms usually improve with resolution of UTI with some residual slowing of gross motor movements. Patient has not had a chance to come completely back to her baseline (full independent ADLs) with new UTIs every 1.5 to 2 months (patient has been on Cipro, Macrobid, Bactrim in the past).  Other ROS includes periods of urinary retention, incontinence, otherwise unremarkable for LOC, chest pain, SOB, N/V, loss of bowel, focal weakness, numbness, tingling. Patient has been seeing neurosurgery outpatient since her traumatic fall in September 2019 resulting in lumbar fracture. Most recent MRI of the brain performed did not show any definitive evidence of NPH, a diagnosis being worked up given her gait, urinary symptoms, and periodic confusion. However, it is unclear at this time due to her recent infectious/metabolic concerns (UTI, electrolyte abnormalities). Patient was recently taken of her home medication, Spironolactone, due to her most recent labs (high K). Patient is due for a diagnostic LP in March. Pertinent hx includes hx of Hepatitis C virus infection, unspecified chronicity, hypothyroidism, HTN, s/p left hip replacement. Neurology consulted for further evaluation.     Impression: Patient mental status improved at this time, no focal deficits. Workup for metabolic/infectious etiology regarding patient's current symptoms     Plan:   -c/w workup and treatment of infectious/metabolic derangements  -PT/OT  -patient had MRI outpatient, low yield at this time for repeat MRI   -neurosurgery following   -PT/OT     Plan not discussed w/ attending

## 2020-02-19 NOTE — H&P ADULT - ASSESSMENT
89 yo woman presents w family with metabolic encephalopathy 2/2 hyponatremia 2/2 UTI. Ptn's mental status is now at baseline post IVF and IV Abx, she had h/o NPH and awaiting a trial of therapeutic LP some time in March.  PLAN: cont IV CTX , cont IVF w NS at 60 cc/hr, rpt bmp in am, check PVR via bladder scan  on ct C/A/P ptn found to have a liver mass. GOC d/w daughter, ptn's HCP: she wants her mother to be DNR/DNI/no artificial nutrition, she would not want a Liver Bx, nor surgery nor chemo. will get GI consult,  check Hepatitis C viral load and AFP level. ptn certainly at risk for HCC. will place on tramadol for pain control  cont outptn meds, DC aldactone,   dvt ppx w po xarelto 10 mg daily

## 2020-02-19 NOTE — H&P ADULT - HISTORY OF PRESENT ILLNESS
88yo F presents w altered mental status, fatigue, urinary incontinence  In the Ed ptn was seen by Neuro/NSx 2/2 Sx possibly being to NPH. Ptn found to have UTI, Hyponatremia and mental status improved post IVF and IV ABx. No intervention offered by Nsurgery    PMH: HTN hypothyroidism, dementia, NPH, h/o Hepatits C, treated 5 years ago by a GI doctor in [place], post treatment viral load was zero as per daughter, ptn hasnt had follow up since. for neatrly a week ptn c/o abd pain , yesterday had an abd sono done and a liver mass was detected. ptn still c/o abd pain and as per daughter the pain has been constant. Ptn is confused and AxO to name and place only, smiling, calm. Frequent UTIs, unsteady gait, on and off vomiting for the past 8 months, undergoing a work up for NPH with NSX, Dr. Slaughter  no fever, no chills, no cough, no CP, no SOB, no change in BMs, no change in appetite, no weight loss. no HAs, No recent falls. No back pain.

## 2020-02-19 NOTE — H&P ADULT - NSICDXFAMILYHX_GEN_ALL_CORE_FT
FAMILY HISTORY:  Family history of colon cancer in mother    Sibling  Still living? Unknown  Family history of diabetes mellitus, Age at diagnosis: Age Unknown  Family history of leukemia, Age at diagnosis: Age Unknown

## 2020-02-19 NOTE — ED PROVIDER NOTE - ATTENDING CONTRIBUTION TO CARE
Attending Statement (LUKE Gonzalez MD):    HPI: 88 y/o F with h/o HTN, Hep C (s/p medical treatment per family), hypothyroidism presenting with urinating on herself, "lethargy" (decreased activity, sleeping more, less desire to eat) per family.  Labs as outpatient yesterday notable for sodium of 126, and family also relates patient noted to have increased "fluid" in her brain on MR recently (? NPH diagnosis).  No fever/chills, no falls, but per family not able to walk on her own over past few days.      ROS as noted above  PSH/PMH as noted above     On Physical Exam:  General: well appearing, in NAD (resident note above notes mild distress, patient at times upset, but not appearing toxic or evidencing respiratory distress), speaking clearly in full sentences and without difficulty; cooperative with exam  HEENT: PERRL, MMM  Neck: no neck tenderness, no nuchal rigidity  Cardiac: normal s1, s2; RRR; no MGR  Lungs: CTABL  Abdomen: soft nondistended; mild TTP in left upper and lower quadrants.  Back: no CVA tenderness  : no bladder tenderness or distension  Skin: intact, no rash; no noted abrasions/lacerations/ecchymoses to chest/abdomen/back/hips  Extremities: no peripheral edema, no gross deformities  Neuro: no gross neurologic deficits; no facial asymmetry, moving all extremities; gait assessment deferred     AP:  88 y/o F with h/o HTN, Hep C (s/p medical treatment per family), hypothyroidism presenting with urinating on herself, "lethargy" (decreased activity, sleeping more, less desire to eat): concern based on history for possible metabolic cause (particularly hyponatremia) vs worsening NPH vs infectious etiology (though afebrile, nontoxic appearing, presentation not c/w sepsis): will obtain CXR (screening for consolidation), ECG (screening), CT Head (eval for NPH / mass), and labs: cbc (r/o anemia), CMP (to evaluate for electrolyte abnormalities or renal/liver dysfunction), UA (eval for UTI).  If hyponatremic, consider sending serum / urine osmolality and urine Na/Cr to assess further.  Patient also noted to have some left sided abdominal tenderness but ? distractible, not appearing to have other symptoms (n/v/d) to suggest intraabdominal pathology; however, given age and tenderness will obtain CT AP to further evaluate.  Consider neuro consult; neurosurgery service aware (patient follows with Dr Slaughter - ? nph).  Disposition: likely admission, pending completion of initial ED w/u.

## 2020-02-19 NOTE — CONSULT NOTE ADULT - ASSESSMENT
89F w/ recent episodes of confusion and urinary incontinence similar to previously when she had a UTI, outside lab report shows +UA and Na 124, likely UTI. Exam: AAOx2 (family translating), FC, MUJICA 5/5 strength, SILT  - no neurosurgical intervention  - labs pending  - uti w/u and tx per medicine

## 2020-02-19 NOTE — ED PROVIDER NOTE - OBJECTIVE STATEMENT
89yo F pmhx HTN hypothyroidism, Ivorian speaking, daughter at bedside to provide translation presents for abd pain and hyponatremia on outpatient labs. Per daughter, since Saturday pt. has been urinating on herself frequently and has been more weak and lethargic than usual, she saw her granddaughter as an oupt yesterday who is a doctor and lebron labs significant for a sodium of 126. Per daughter, pt. has had progressive difficulty with walking for months and worsening confusion. Pt. has had urinary incontinence in the past when she had a UTI. No recent falls. No back pain. Denies HA fever chills cp sob n/v/d numbness/tingling.

## 2020-02-19 NOTE — ED ADULT NURSE REASSESSMENT NOTE - NS ED NURSE REASSESS COMMENT FT1
Report received from Diana SANTANA, resp nonlabored, skin warm/dry, resting comfortably in bed with family at bedside. Pt denies headache, dizziness, chest pain, palpitations, SOB, abd pain, n/v/d, urinary symptoms, fevers, chills, weakness at this time. Pt awaiting bed assignment. Safety maintained with call bell within reach.

## 2020-02-19 NOTE — ED PROVIDER NOTE - CLINICAL SUMMARY MEDICAL DECISION MAKING FREE TEXT BOX
88yo F w/ htn hypothyroidism p/w abd pain, low sodium and urinary incontinence - will recheck labs, send UA/urine culture, CT abd/pelv, also CT head as symptoms may be consistent with NPH.

## 2020-02-19 NOTE — H&P ADULT - NSHPPHYSICALEXAM_GEN_ALL_CORE
T(F): 98 (02-19-20 @ 14:29), Max: 98.3 (02-19-20 @ 13:30)  HR: 77 (02-19-20 @ 17:44) (67 - 77)  BP: 168/54 (02-19-20 @ 17:44) (145/70 - 168/54)  RR: 18 (02-19-20 @ 17:44) (18 - 20)  SpO2: 98% (02-19-20 @ 17:44) (98% - 99%)    GENERAL: NAD, well-developed, confused, AxO to name, place only, daughter at bedside  HEAD:  Atraumatic, Normocephalic  EYES: EOMI, PERRLA, conjunctiva and sclera clear  NECK: Supple, No JVD  CHEST/LUNG: Clear to auscultation bilaterally; No wheeze  HEART: Regular rate and rhythm; No murmurs, rubs, or gallops  ABDOMEN: Soft, ruq/midepi and luq tenderness, no rebound, Nondistended; Bowel sounds present  EXTREMITIES:  2+ Peripheral Pulses, No clubbing, cyanosis, or edema  PSYCH: AAOx3  NEUROLOGY: non-focal  SKIN: No rashes or lesions
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
## 2020-02-20 LAB
24R-OH-CALCIDIOL SERPL-MCNC: 26.7 NG/ML — LOW (ref 30–80)
AFP-TM SERPL-MCNC: 1024 NG/ML — HIGH
ANION GAP SERPL CALC-SCNC: 14 MMOL/L — SIGNIFICANT CHANGE UP (ref 5–17)
BUN SERPL-MCNC: 18 MG/DL — SIGNIFICANT CHANGE UP (ref 7–23)
CALCIUM SERPL-MCNC: 8.9 MG/DL — SIGNIFICANT CHANGE UP (ref 8.4–10.5)
CHLORIDE SERPL-SCNC: 95 MMOL/L — LOW (ref 96–108)
CO2 SERPL-SCNC: 20 MMOL/L — LOW (ref 22–31)
CREAT SERPL-MCNC: 0.66 MG/DL — SIGNIFICANT CHANGE UP (ref 0.5–1.3)
GLUCOSE SERPL-MCNC: 115 MG/DL — HIGH (ref 70–99)
HBA1C BLD-MCNC: 6 % — HIGH (ref 4–5.6)
HCT VFR BLD CALC: 30.2 % — LOW (ref 34.5–45)
HGB BLD-MCNC: 9.9 G/DL — LOW (ref 11.5–15.5)
MCHC RBC-ENTMCNC: 25.8 PG — LOW (ref 27–34)
MCHC RBC-ENTMCNC: 32.8 GM/DL — SIGNIFICANT CHANGE UP (ref 32–36)
MCV RBC AUTO: 78.9 FL — LOW (ref 80–100)
NRBC # BLD: 0 /100 WBCS — SIGNIFICANT CHANGE UP (ref 0–0)
PLATELET # BLD AUTO: 188 K/UL — SIGNIFICANT CHANGE UP (ref 150–400)
POTASSIUM SERPL-MCNC: 4.2 MMOL/L — SIGNIFICANT CHANGE UP (ref 3.5–5.3)
POTASSIUM SERPL-SCNC: 4.2 MMOL/L — SIGNIFICANT CHANGE UP (ref 3.5–5.3)
RBC # BLD: 3.83 M/UL — SIGNIFICANT CHANGE UP (ref 3.8–5.2)
RBC # FLD: 13.2 % — SIGNIFICANT CHANGE UP (ref 10.3–14.5)
SODIUM SERPL-SCNC: 129 MMOL/L — LOW (ref 135–145)
TSH SERPL-MCNC: 1.14 UIU/ML — SIGNIFICANT CHANGE UP (ref 0.27–4.2)
WBC # BLD: 5.43 K/UL — SIGNIFICANT CHANGE UP (ref 3.8–10.5)
WBC # FLD AUTO: 5.43 K/UL — SIGNIFICANT CHANGE UP (ref 3.8–10.5)

## 2020-02-20 RX ORDER — INFLUENZA VIRUS VACCINE 15; 15; 15; 15 UG/.5ML; UG/.5ML; UG/.5ML; UG/.5ML
0.5 SUSPENSION INTRAMUSCULAR ONCE
Refills: 0 | Status: DISCONTINUED | OUTPATIENT
Start: 2020-02-20 | End: 2020-02-21

## 2020-02-20 RX ORDER — CHOLECALCIFEROL (VITAMIN D3) 125 MCG
1000 CAPSULE ORAL DAILY
Refills: 0 | Status: DISCONTINUED | OUTPATIENT
Start: 2020-02-20 | End: 2020-02-21

## 2020-02-20 RX ADMIN — Medication 50 MILLIGRAM(S): at 06:49

## 2020-02-20 RX ADMIN — Medication 100 MILLIGRAM(S): at 15:40

## 2020-02-20 RX ADMIN — SODIUM CHLORIDE 60 MILLILITER(S): 9 INJECTION INTRAMUSCULAR; INTRAVENOUS; SUBCUTANEOUS at 21:44

## 2020-02-20 RX ADMIN — AZILSARTAN KAMEDOXOMIL 40 MILLIGRAM(S): 40 TABLET ORAL at 21:40

## 2020-02-20 RX ADMIN — Medication 125 MICROGRAM(S): at 06:49

## 2020-02-20 RX ADMIN — CEFTRIAXONE 100 MILLIGRAM(S): 500 INJECTION, POWDER, FOR SOLUTION INTRAMUSCULAR; INTRAVENOUS at 17:19

## 2020-02-20 RX ADMIN — RIVAROXABAN 10 MILLIGRAM(S): KIT at 17:31

## 2020-02-20 RX ADMIN — Medication 100 MILLIGRAM(S): at 21:43

## 2020-02-20 RX ADMIN — Medication 1000 UNIT(S): at 17:31

## 2020-02-20 NOTE — PROGRESS NOTE ADULT - ASSESSMENT
88 yo woman presents w family with metabolic encephalopathy 2/2 hyponatremia 2/2 UTI.  Ptn's mental status is now at baseline post IVF and IV Abx,   she had h/o NPH and awaiting a trial of therapeutic LP some time in March.  incidental finding of a liver mass w elevated LFTS and a very high AFP level, h/o hep C, viral load ordered  seen by GI, findings diagnostic of HCC, family wants comfort care, ptn is pain free today, daughter wants to defer on hospice consult, will give her info.  cont IV CTX , cont IVF w NS at 60 cc/hr, rpt bmp in am, check PVR via bladder scan  GOC compared w daughter: DNR/DNI/no artificial nutrition  dvt ppx w po xarelto 10 mg daily

## 2020-02-20 NOTE — PHYSICAL THERAPY INITIAL EVALUATION ADULT - CRITERIA FOR SKILLED THERAPEUTIC INTERVENTIONS
rehab potential/functional limitations in following categories/risk reduction/prevention/anticipated discharge recommendation/impairments found

## 2020-02-20 NOTE — CONSULT NOTE ADULT - SUBJECTIVE AND OBJECTIVE BOX
SUBJECTIVE:  89yFemale referred for incidental finding of a liver mass  Patient is a poor informant and information is obtained from her daughter, Laurie  Patient has a hx of hepatitis C treated by a GI in [place] a few years back with "6 months of oral medication" with apparent SVR  She does not have a hx of cirrhosis  She has been complaining of on and off RUQ and recently LUQ pain and Laurie brought her to a family member for a sonogram that showed a "liver mass"  There are no reports of nausea, vomiting, jaundice or change in stool color  No recent weight loss or change in appetite    Patient with hx of recurrent UTIs that cause altered mental status  Last UTI a month ago, treated with Macrobid, now with recurrent symptoms    She also has a hx of acutely unstable gait and memory loss (independent of UTIs) and is being evaluated for possible normal pressure hydrocephalus by Dr. Slaughter (neurosurgery)  ______________________________________________________________________  PMH/PSH:  PAST MEDICAL & SURGICAL HISTORY:  Dementia  NPH (normal pressure hydrocephalus)  Hepatitis C virus infection, unspecified chronicity: s/p treatment w post treatment virus load at 0, as per daughter  Dizziness  HTN (hypertension)  H/O abdominal hysterectomy  Status post left hip replacement    ______________________________________________________________________  MEDS:  MEDICATIONS  (STANDING):  azilsartan 40 milliGRAM(s) Oral every 24 hours  cefTRIAXone   IVPB 1000 milliGRAM(s) IV Intermittent every 24 hours  escitalopram 10 milliGRAM(s) Oral daily  hydrALAZINE 50 milliGRAM(s) Oral daily  hydrALAZINE 100 milliGRAM(s) Oral <User Schedule>  levothyroxine 125 MICROGram(s) Oral <User Schedule>  meclizine 12.5 milliGRAM(s) Oral daily  memantine ER 7 milliGRAM(s) Oral daily  metoprolol succinate  milliGRAM(s) Oral daily  rivaroxaban 10 milliGRAM(s) Oral daily  sodium chloride 0.9%. 1000 milliLiter(s) (60 mL/Hr) IV Continuous <Continuous>    MEDICATIONS  (PRN):  traMADol 25 milliGRAM(s) Oral three times a day PRN Moderate Pain (4 - 6)    ______________________________________________________________________  ALL:   Allergies    famotidine (Unknown)  penicillin (Rash)  Tagamet (Unknown)  Tylenol (Unknown)  Zantac (Unknown)    Intolerances      ______________________________________________________________________  SH: no active toxic habits, lives with daughter  ______________________________________________________________________  FH:  FAMILY HISTORY:  Family history of leukemia (Sibling)  Family history of diabetes mellitus (Sibling)  Family history of colon cancer in mother and son    ______________________________________________________________________  ROS:    CONSTITUTIONAL:  No weight loss, fever, chills, weakness or fatigue.    HEENT:  Eyes:  No visual loss, blurred vision, double vision or yellow sclerae. Ears, Nose, Throat:  No hearing loss, sneezing, congestion, runny nose or sore throat.    SKIN:  No rash or itching.    CARDIOVASCULAR:  No chest pain, chest pressure or chest discomfort. No palpitations or edema.    RESPIRATORY:  No shortness of breath, cough or sputum.    GASTROINTESTINAL:  SEE HPI    GENITOURINARY:  No dysuria, hematuria, urinary frequency    NEUROLOGICAL:  No headache, dizziness, syncope, paralysis, ataxia, numbness or tingling in the extremities. No change in bowel or bladder control.    MUSCULOSKELETAL:  No muscle, back pain, joint pain or stiffness.    HEMATOLOGIC:  No anemia, bleeding or bruising.    LYMPHATICS:  No enlarged nodes. No history of splenectomy.    PSYCHIATRIC:  No history of depression or anxiety.    ENDOCRINOLOGIC:  No reports of sweating, cold or heat intolerance. No polyuria or polydipsia.    ALLERGIES:  No history of asthma, hives, eczema or rhinitis.  ______________________________________________________________________  PHYSICAL EXAM:  T(C): 36.8 (02-20-20 @ 06:11), Max: 36.9 (02-19-20 @ 22:14)  HR: 74 (02-20-20 @ 06:11)  BP: 150/68 (02-20-20 @ 06:11)  RR: 18 (02-20-20 @ 06:11)  SpO2: 94% (02-20-20 @ 06:11)  Wt(kg): --    PHYSICAL EXAM:      Constitutional: NAD    Eyes: anicteric    ENMT: mmm    Respiratory: cta ant    Cardiovascular: rr s1/s2 nl     Gastrointestinal: soft ntnd +bs no r/g/hsm    Extremities: no c/c    Skin: no stigmata of portal htn    Psychiatric: a&o x 1      ______________________________________________________________________  LABS:                        9.9    5.43  )-----------( 188      ( 20 Feb 2020 06:28 )             30.2     02-20    129<L>  |  95<L>  |  18  ----------------------------<  115<H>  4.2   |  20<L>  |  0.66    Ca    8.9      20 Feb 2020 06:28    TPro  8.3  /  Alb  3.4  /  TBili  0.4  /  DBili  x   /  AST  55<H>  /  ALT  52<H>  /  AlkPhos  136<H>  02-19    ______________________________________________________________________  IMAGING:  CT Abdomen and Pelvis w/ IV Cont:   EXAM:  CT ABDOMEN AND PELVIS IC                        PROCEDURE DATE:  02/19/2020    INTERPRETATION:  CLINICAL INFORMATION: Severe abdominal pain for one week.  Hyponatremia.    COMPARISON: CT chest 5/4/2018.    PROCEDURE:   CT of the Abdomen and Pelvis was performed with intravenous contrast.   Intravenous contrast: 90 ml Omnipaque 350. 10 ml discarded.  Oral contrast: None.  Sagittal and coronal reformats were performed.    FINDINGS:    LOWER CHEST: Right lower lobe calcified granuloma. Bibasilar subsegmental atelectasis. Coronary/aortic valvular calcifications.    LIVER: A heterogeneous lobulated hypodense right hepatic mass measures approximately 7.4 x 5.4 x 6.9 cm (2:31, 602:62). 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  BILE DUCTS: Within normal limits.  GALLBLADDER: Contracted.  SPLEEN: Within normal limits.  PANCREAS: Within normal limits.  ADRENALS: Within normal limits.  KIDNEYS/URETERS: Bilateral hypodense foci, too small to characterize. Symmetric bilateral renal enhancement. No hydronephrosis. A nonobstructing calculus in the lower pole of the right kidney measures 0.4 cm.    BLADDER: Minimally distended.  REPRODUCTIVE ORGANS: A left adnexal cyst measures 1.8 cm.    BOWEL: No bowelobstruction. Appendix is normal.  PERITONEUM: No ascites.  VESSELS: Atherosclerotic changes.  RETROPERITONEUM/LYMPH NODES: No lymphadenopathy.    ABDOMINAL WALL: Tiny fat-containing umbilical hernia.  BONES: Degenerative changes. Age-indeterminate severe lumbar compression deformities, most severe at L1 and L2. Status post left total hip replacement.    IMPRESSION:     A 7.4 cm heterogeneous lobulated right hepatic mass with associated thrombosis of the right portal vein. Differential diagnosis includes neoplastic and, somewhat less likely, infectious etiologies. Further evaluation with contrast-enhanced abdominal MRI is recommended.    These findings were discussed with Dr. Burns on 2/19/2020 at 5:43 PM by Dr. Cardenas with read back confirmation.    PITO CARDENAS M.D., RADIOLOGY RESIDENT  This document has been electronically signed.  AMELIA BATRES M.D., ATTENDING RADIOLOGIST  This document has been electronically signed. Feb 19 2020  5:51PM             (02-19-20 @ 17:07)    ______________________________________________________________________  ASSESSMENT:  89y Female hx of hepatitis C (unclear status of fibrosis but some of her blood work suggestive of advanced liver disease - low Albumin) s/p treatment with apparent SVR now with liver mass and portal vein thrombosis.  DDx includes primary liver cancer (HCC) or metastatic, less likely abscess or vascular malformation.    I had a long discussion with patients' daughter Laurie regarding work up and prognosis.  She does NOT want her mother to undergo invasive testing such as biopsy or colonoscopy.  She asked appropriate questions about treatment options for liver cancer and does not want her mom exposed to chemo, radiation therapy, TACE or RFA.  In light of family's wishes, she also questions the utility of MRI - i.e. even if it confirms HCC, there are no plans to treat and she does not think her mom will tolerate an MRI due to claustrophobia/confusion.    PLAN:  1.  Await AFP and CEA level  2.  UTI treatment per primary team - daughter also requests a urology consult to "maybe prevent future UTIs"  3.  If AFP level elevated would be diagnostic of HCC and daughter would be interested in home palliative care to help manage future issues (such as pain)      David Jimenez M.D.  [facility]  (O) [phone]

## 2020-02-20 NOTE — PHYSICAL THERAPY INITIAL EVALUATION ADULT - PRECAUTIONS/LIMITATIONS, REHAB EVAL
fall precautions/CT Abdomen and Pelvis w/ IV Cont (02.19.20 @ 17:07) >A 7.4 cm heterogeneous lobulated right hepatic mass with associated thrombosis of the right portal vein. Differential diagnosis includes neoplastic and, somewhat less likely, infectious etiologies. Further evaluation with contrast-enhanced abdominal MRI is recommended.

## 2020-02-20 NOTE — PHYSICAL THERAPY INITIAL EVALUATION ADULT - PERTINENT HX OF CURRENT PROBLEM, REHAB EVAL
88yo F pmhx Dementia, NPH, Hep C s/p tx 5yrs ago, HTN hypothyroidism, [place] speaking, daughter at bedside to provide translation presents for abd pain and hyponatremia on outpatient labs., Dx: Metabolic Encephalopathy likely 2/2 UTI, Liver Mass w/ possible HCC

## 2020-02-20 NOTE — PROGRESS NOTE ADULT - SUBJECTIVE AND OBJECTIVE BOX
Patient is a 89y old  Female who presents with a chief complaint of altered mental status, uti, hyponatremia, liver mass, abnormal LFTs (2020 09:36)      SUBJECTIVE / OVERNIGHT EVENTS: ptn is awake, alert, Mental Status at baseline, daughter states ptn has urgency and frequency, eating well    MEDICATIONS  (STANDING):  azilsartan 40 milliGRAM(s) Oral every 24 hours  cefTRIAXone   IVPB 1000 milliGRAM(s) IV Intermittent every 24 hours  cholecalciferol 1000 Unit(s) Oral daily  escitalopram 10 milliGRAM(s) Oral daily  hydrALAZINE 50 milliGRAM(s) Oral daily  hydrALAZINE 100 milliGRAM(s) Oral <User Schedule>  influenza   Vaccine 0.5 milliLiter(s) IntraMuscular once  levothyroxine 125 MICROGram(s) Oral <User Schedule>  meclizine 12.5 milliGRAM(s) Oral daily  memantine ER 7 milliGRAM(s) Oral daily  metoprolol succinate  milliGRAM(s) Oral daily  rivaroxaban 10 milliGRAM(s) Oral daily  sodium chloride 0.9%. 1000 milliLiter(s) (60 mL/Hr) IV Continuous <Continuous>    MEDICATIONS  (PRN):  traMADol 25 milliGRAM(s) Oral three times a day PRN Moderate Pain (4 - 6)      Vital Signs Last 24 Hrs  T(F): 97.7 (20 @ 14:22), Max: 98.5 (20 @ 22:14)  HR: 79 (20 @ 15:01) (74 - 83)  BP: 158/72 (20 @ 15:01) (150/68 - 175/69)  RR: 18 (20 @ 15:01) (16 - 18)  SpO2: 95% (20 @ 15:01) (94% - 98%)  Telemetry:   CAPILLARY BLOOD GLUCOSE        I&O's Summary      PHYSICAL EXAM:  GENERAL: NAD, well-developed  HEAD:  Atraumatic, Normocephalic  EYES: EOMI, PERRLA, conjunctiva and sclera clear  NECK: Supple, No JVD  CHEST/LUNG: Clear to auscultation bilaterally; No wheeze  HEART: Regular rate and rhythm; No murmurs, rubs, or gallops  ABDOMEN: Soft, Nontender, Nondistended; Bowel sounds present  EXTREMITIES:  2+ Peripheral Pulses, No clubbing, cyanosis, or edema  PSYCH: AAOx3  NEUROLOGY: non-focal  SKIN: No rashes or lesions    LABS:                        9.9    5.43  )-----------( 188      ( 2020 06:28 )             30.2     02-20    129<L>  |  95<L>  |  18  ----------------------------<  115<H>  4.2   |  20<L>  |  0.66    Ca    8.9      2020 06:28    TPro  8.3  /  Alb  3.4  /  TBili  0.4  /  DBili  x   /  AST  55<H>  /  ALT  52<H>  /  AlkPhos  136<H>            Urinalysis Basic - ( 2020 16:38 )    Color: Yellow / Appearance: Slightly Turbid / S.018 / pH: x  Gluc: x / Ketone: Negative  / Bili: Negative / Urobili: Negative   Blood: x / Protein: Trace / Nitrite: Positive   Leuk Esterase: Moderate / RBC: 3 /hpf / WBC 13 /HPF   Sq Epi: x / Non Sq Epi: 3 / Bacteria: Many        RADIOLOGY & ADDITIONAL TESTS:    Imaging Personally Reviewed:    Consultant(s) Notes Reviewed:      Care Discussed with Consultants/Other Providers:

## 2020-02-20 NOTE — PHYSICAL THERAPY INITIAL EVALUATION ADULT - ADDITIONAL COMMENTS
Patient lives dru building apartment with elevator access/no stairs.  Patient is always has assist with short distance indoor ambulation with rollator, requires assistance with bathroom transfers and ADLs. owns all DME necessary like RW and WC

## 2020-02-21 ENCOUNTER — TRANSCRIPTION ENCOUNTER (OUTPATIENT)
Age: 85
End: 2020-02-21

## 2020-02-21 VITALS
RESPIRATION RATE: 18 BRPM | TEMPERATURE: 98 F | SYSTOLIC BLOOD PRESSURE: 148 MMHG | OXYGEN SATURATION: 96 % | HEART RATE: 58 BPM | DIASTOLIC BLOOD PRESSURE: 70 MMHG

## 2020-02-21 DIAGNOSIS — G93.41 METABOLIC ENCEPHALOPATHY: ICD-10-CM

## 2020-02-21 DIAGNOSIS — G91.2 (IDIOPATHIC) NORMAL PRESSURE HYDROCEPHALUS: ICD-10-CM

## 2020-02-21 DIAGNOSIS — I10 ESSENTIAL (PRIMARY) HYPERTENSION: ICD-10-CM

## 2020-02-21 DIAGNOSIS — F03.90 UNSPECIFIED DEMENTIA WITHOUT BEHAVIORAL DISTURBANCE: ICD-10-CM

## 2020-02-21 DIAGNOSIS — E87.1 HYPO-OSMOLALITY AND HYPONATREMIA: ICD-10-CM

## 2020-02-21 LAB
-  AMIKACIN: SIGNIFICANT CHANGE UP
-  AMPICILLIN/SULBACTAM: SIGNIFICANT CHANGE UP
-  AMPICILLIN: SIGNIFICANT CHANGE UP
-  AZTREONAM: SIGNIFICANT CHANGE UP
-  CEFAZOLIN: SIGNIFICANT CHANGE UP
-  CEFEPIME: SIGNIFICANT CHANGE UP
-  CEFOXITIN: SIGNIFICANT CHANGE UP
-  CEFTRIAXONE: SIGNIFICANT CHANGE UP
-  CIPROFLOXACIN: SIGNIFICANT CHANGE UP
-  GENTAMICIN: SIGNIFICANT CHANGE UP
-  IMIPENEM: SIGNIFICANT CHANGE UP
-  LEVOFLOXACIN: SIGNIFICANT CHANGE UP
-  MEROPENEM: SIGNIFICANT CHANGE UP
-  NITROFURANTOIN: SIGNIFICANT CHANGE UP
-  PIPERACILLIN/TAZOBACTAM: SIGNIFICANT CHANGE UP
-  TIGECYCLINE: SIGNIFICANT CHANGE UP
-  TOBRAMYCIN: SIGNIFICANT CHANGE UP
-  TRIMETHOPRIM/SULFAMETHOXAZOLE: SIGNIFICANT CHANGE UP
ALBUMIN SERPL ELPH-MCNC: 3.2 G/DL — LOW (ref 3.3–5)
ALP SERPL-CCNC: 119 U/L — SIGNIFICANT CHANGE UP (ref 40–120)
ALT FLD-CCNC: 39 U/L — SIGNIFICANT CHANGE UP (ref 10–45)
ANION GAP SERPL CALC-SCNC: 11 MMOL/L — SIGNIFICANT CHANGE UP (ref 5–17)
AST SERPL-CCNC: 32 U/L — SIGNIFICANT CHANGE UP (ref 10–40)
BASOPHILS # BLD AUTO: 0.03 K/UL — SIGNIFICANT CHANGE UP (ref 0–0.2)
BASOPHILS NFR BLD AUTO: 0.7 % — SIGNIFICANT CHANGE UP (ref 0–2)
BILIRUB SERPL-MCNC: 0.3 MG/DL — SIGNIFICANT CHANGE UP (ref 0.2–1.2)
BUN SERPL-MCNC: 15 MG/DL — SIGNIFICANT CHANGE UP (ref 7–23)
CALCIUM SERPL-MCNC: 9 MG/DL — SIGNIFICANT CHANGE UP (ref 8.4–10.5)
CHLORIDE SERPL-SCNC: 97 MMOL/L — SIGNIFICANT CHANGE UP (ref 96–108)
CO2 SERPL-SCNC: 22 MMOL/L — SIGNIFICANT CHANGE UP (ref 22–31)
CREAT SERPL-MCNC: 0.67 MG/DL — SIGNIFICANT CHANGE UP (ref 0.5–1.3)
CULTURE RESULTS: SIGNIFICANT CHANGE UP
EOSINOPHIL # BLD AUTO: 0.17 K/UL — SIGNIFICANT CHANGE UP (ref 0–0.5)
EOSINOPHIL NFR BLD AUTO: 3.7 % — SIGNIFICANT CHANGE UP (ref 0–6)
GLUCOSE SERPL-MCNC: 99 MG/DL — SIGNIFICANT CHANGE UP (ref 70–99)
HCT VFR BLD CALC: 33.4 % — LOW (ref 34.5–45)
HCV RNA SERPL NAA DL=5-ACNC: SIGNIFICANT CHANGE UP IU/ML
HCV RNA SPEC NAA+PROBE-LOG IU: SIGNIFICANT CHANGE UP LOG10IU/ML
HGB BLD-MCNC: 10.4 G/DL — LOW (ref 11.5–15.5)
IMM GRANULOCYTES NFR BLD AUTO: 0.7 % — SIGNIFICANT CHANGE UP (ref 0–1.5)
LYMPHOCYTES # BLD AUTO: 1.15 K/UL — SIGNIFICANT CHANGE UP (ref 1–3.3)
LYMPHOCYTES # BLD AUTO: 25.2 % — SIGNIFICANT CHANGE UP (ref 13–44)
MCHC RBC-ENTMCNC: 25.2 PG — LOW (ref 27–34)
MCHC RBC-ENTMCNC: 31.1 GM/DL — LOW (ref 32–36)
MCV RBC AUTO: 80.9 FL — SIGNIFICANT CHANGE UP (ref 80–100)
METHOD TYPE: SIGNIFICANT CHANGE UP
MONOCYTES # BLD AUTO: 0.56 K/UL — SIGNIFICANT CHANGE UP (ref 0–0.9)
MONOCYTES NFR BLD AUTO: 12.3 % — SIGNIFICANT CHANGE UP (ref 2–14)
NEUTROPHILS # BLD AUTO: 2.62 K/UL — SIGNIFICANT CHANGE UP (ref 1.8–7.4)
NEUTROPHILS NFR BLD AUTO: 57.4 % — SIGNIFICANT CHANGE UP (ref 43–77)
NRBC # BLD: 0 /100 WBCS — SIGNIFICANT CHANGE UP (ref 0–0)
ORGANISM # SPEC MICROSCOPIC CNT: SIGNIFICANT CHANGE UP
ORGANISM # SPEC MICROSCOPIC CNT: SIGNIFICANT CHANGE UP
PLATELET # BLD AUTO: 210 K/UL — SIGNIFICANT CHANGE UP (ref 150–400)
POTASSIUM SERPL-MCNC: 4.2 MMOL/L — SIGNIFICANT CHANGE UP (ref 3.5–5.3)
POTASSIUM SERPL-SCNC: 4.2 MMOL/L — SIGNIFICANT CHANGE UP (ref 3.5–5.3)
PROT SERPL-MCNC: 7.4 G/DL — SIGNIFICANT CHANGE UP (ref 6–8.3)
RBC # BLD: 4.13 M/UL — SIGNIFICANT CHANGE UP (ref 3.8–5.2)
RBC # FLD: 13.2 % — SIGNIFICANT CHANGE UP (ref 10.3–14.5)
SODIUM SERPL-SCNC: 130 MMOL/L — LOW (ref 135–145)
SPECIMEN SOURCE: SIGNIFICANT CHANGE UP
WBC # BLD: 4.56 K/UL — SIGNIFICANT CHANGE UP (ref 3.8–10.5)
WBC # FLD AUTO: 4.56 K/UL — SIGNIFICANT CHANGE UP (ref 3.8–10.5)

## 2020-02-21 PROCEDURE — 93005 ELECTROCARDIOGRAM TRACING: CPT

## 2020-02-21 PROCEDURE — 80053 COMPREHEN METABOLIC PANEL: CPT

## 2020-02-21 PROCEDURE — 83036 HEMOGLOBIN GLYCOSYLATED A1C: CPT

## 2020-02-21 PROCEDURE — 99238 HOSP IP/OBS DSCHRG MGMT 30/<: CPT

## 2020-02-21 PROCEDURE — 82105 ALPHA-FETOPROTEIN SERUM: CPT

## 2020-02-21 PROCEDURE — 84300 ASSAY OF URINE SODIUM: CPT

## 2020-02-21 PROCEDURE — 85014 HEMATOCRIT: CPT

## 2020-02-21 PROCEDURE — 82435 ASSAY OF BLOOD CHLORIDE: CPT

## 2020-02-21 PROCEDURE — 99285 EMERGENCY DEPT VISIT HI MDM: CPT | Mod: 25

## 2020-02-21 PROCEDURE — 82306 VITAMIN D 25 HYDROXY: CPT

## 2020-02-21 PROCEDURE — 85027 COMPLETE CBC AUTOMATED: CPT

## 2020-02-21 PROCEDURE — 87086 URINE CULTURE/COLONY COUNT: CPT

## 2020-02-21 PROCEDURE — 84443 ASSAY THYROID STIM HORMONE: CPT

## 2020-02-21 PROCEDURE — 84295 ASSAY OF SERUM SODIUM: CPT

## 2020-02-21 PROCEDURE — 83935 ASSAY OF URINE OSMOLALITY: CPT

## 2020-02-21 PROCEDURE — 96374 THER/PROPH/DIAG INJ IV PUSH: CPT

## 2020-02-21 PROCEDURE — 70450 CT HEAD/BRAIN W/O DYE: CPT

## 2020-02-21 PROCEDURE — 82803 BLOOD GASES ANY COMBINATION: CPT

## 2020-02-21 PROCEDURE — 82565 ASSAY OF CREATININE: CPT

## 2020-02-21 PROCEDURE — 81001 URINALYSIS AUTO W/SCOPE: CPT

## 2020-02-21 PROCEDURE — 83690 ASSAY OF LIPASE: CPT

## 2020-02-21 PROCEDURE — 84132 ASSAY OF SERUM POTASSIUM: CPT

## 2020-02-21 PROCEDURE — 82330 ASSAY OF CALCIUM: CPT

## 2020-02-21 PROCEDURE — 83605 ASSAY OF LACTIC ACID: CPT

## 2020-02-21 PROCEDURE — 97162 PT EVAL MOD COMPLEX 30 MIN: CPT

## 2020-02-21 PROCEDURE — 82570 ASSAY OF URINE CREATININE: CPT

## 2020-02-21 PROCEDURE — 87186 SC STD MICRODIL/AGAR DIL: CPT

## 2020-02-21 PROCEDURE — 82947 ASSAY GLUCOSE BLOOD QUANT: CPT

## 2020-02-21 PROCEDURE — 87522 HEPATITIS C REVRS TRNSCRPJ: CPT

## 2020-02-21 PROCEDURE — 80048 BASIC METABOLIC PNL TOTAL CA: CPT

## 2020-02-21 PROCEDURE — 74177 CT ABD & PELVIS W/CONTRAST: CPT

## 2020-02-21 RX ORDER — HYDRALAZINE HCL 50 MG
1 TABLET ORAL
Qty: 0 | Refills: 0 | DISCHARGE
Start: 2020-02-21

## 2020-02-21 RX ORDER — CEFUROXIME AXETIL 250 MG
1 TABLET ORAL
Qty: 8 | Refills: 0
Start: 2020-02-21 | End: 2020-02-24

## 2020-02-21 RX ORDER — SPIRONOLACTONE 25 MG/1
1 TABLET, FILM COATED ORAL
Qty: 0 | Refills: 0 | DISCHARGE

## 2020-02-21 RX ORDER — CHLORHEXIDINE GLUCONATE 213 G/1000ML
1 SOLUTION TOPICAL DAILY
Refills: 0 | Status: DISCONTINUED | OUTPATIENT
Start: 2020-02-21 | End: 2020-02-21

## 2020-02-21 RX ORDER — MEMANTINE HYDROCHLORIDE 10 MG/1
1 TABLET ORAL
Qty: 0 | Refills: 0 | DISCHARGE
Start: 2020-02-21

## 2020-02-21 RX ORDER — CHOLECALCIFEROL (VITAMIN D3) 125 MCG
1000 CAPSULE ORAL
Qty: 30 | Refills: 0
Start: 2020-02-21 | End: 2020-03-21

## 2020-02-21 RX ORDER — HYDRALAZINE HCL 50 MG
1 TABLET ORAL
Qty: 0 | Refills: 0 | DISCHARGE

## 2020-02-21 RX ORDER — MEMANTINE HYDROCHLORIDE 10 MG/1
1 TABLET ORAL
Qty: 0 | Refills: 0 | DISCHARGE

## 2020-02-21 RX ORDER — AZILSARTAN KAMEDOXOMIL 40 MG/1
1 TABLET ORAL
Qty: 0 | Refills: 0 | DISCHARGE
Start: 2020-02-21

## 2020-02-21 RX ORDER — AZILSARTAN KAMEDOXOMIL 40 MG/1
1 TABLET ORAL
Qty: 0 | Refills: 0 | DISCHARGE

## 2020-02-21 RX ORDER — LEVOTHYROXINE SODIUM 125 MCG
1 TABLET ORAL
Qty: 30 | Refills: 0
Start: 2020-02-21 | End: 2020-03-21

## 2020-02-21 RX ORDER — LEVOTHYROXINE SODIUM 125 MCG
1 TABLET ORAL
Qty: 0 | Refills: 0 | DISCHARGE

## 2020-02-21 RX ADMIN — CEFTRIAXONE 100 MILLIGRAM(S): 500 INJECTION, POWDER, FOR SOLUTION INTRAMUSCULAR; INTRAVENOUS at 16:18

## 2020-02-21 RX ADMIN — Medication 50 MILLIGRAM(S): at 06:28

## 2020-02-21 RX ADMIN — Medication 12.5 MILLIGRAM(S): at 11:24

## 2020-02-21 RX ADMIN — ESCITALOPRAM OXALATE 10 MILLIGRAM(S): 10 TABLET, FILM COATED ORAL at 11:24

## 2020-02-21 RX ADMIN — Medication 1000 UNIT(S): at 11:24

## 2020-02-21 RX ADMIN — Medication 125 MICROGRAM(S): at 06:30

## 2020-02-21 RX ADMIN — RIVAROXABAN 10 MILLIGRAM(S): KIT at 11:24

## 2020-02-21 RX ADMIN — MEMANTINE HYDROCHLORIDE 7 MILLIGRAM(S): 10 TABLET ORAL at 11:27

## 2020-02-21 RX ADMIN — Medication 100 MILLIGRAM(S): at 06:28

## 2020-02-21 RX ADMIN — CHLORHEXIDINE GLUCONATE 1 APPLICATION(S): 213 SOLUTION TOPICAL at 11:25

## 2020-02-21 RX ADMIN — Medication 100 MILLIGRAM(S): at 16:17

## 2020-02-21 NOTE — PROGRESS NOTE ADULT - ASSESSMENT
88 yo woman presents w family with metabolic encephalopathy 2/2 hyponatremia 2/2 UTI.  Ptn's mental status is now at baseline post IVF and IV Abx,   she had h/o NPH and awaiting a trial of therapeutic LP some time in March. will need clearance w PCP post completing of treatment  incidental finding of a liver mass w elevated LFTS and a very high AFP level, h/o hep C, treated,  viral load ordered  seen by GI, findings diagnostic of HCC, family wants comfort care, ptn is pain free today, daughter wants to defer on hospice consult, will give her info.  cont IV CTX 3rd dose today, dc home on 4 more days of Ceftin 250 bid , hyponatremia improved  GOC compared w daughter: DNR/DNI/no artificial nutrition  dvt ppx w po xarelto 10 mg daily, dc upon DC  recommend home w home PT

## 2020-02-21 NOTE — DISCHARGE NOTE PROVIDER - CARE PROVIDER_API CALL
Jonathan Slaughter)  Neurological Surgery  [address], [address]  [place] [address]  Phone: [phone]  Fax: [phone]  Follow Up Time:     Braxton Poe)  Medicine  [address], [address]  [place] [address]  Phone: [phone]  Fax: [phone]  Follow Up Time:

## 2020-02-21 NOTE — DISCHARGE NOTE PROVIDER - HOSPITAL COURSE
88yo F presents w altered mental status, fatigue, urinary incontinence    In the Ed ptn was seen by Neuro/NSx 2/2 Sx possibly being to NPH. Ptn found to have UTI, Hyponatremia and mental status improved post IVF and IV ABx. No intervention offered by Nsurgery 88yo F presents w altered mental status, fatigue, urinary incontinence    In the Ed ptn was seen by Neuro/NSx 2/2 Sx possibly being to NPH. Ptn found to have UTI, Hyponatremia and mental status improved post IVF and IV ABx. No intervention offered by Nsurgery . Patient's mental status is now at baseline post IVF and IV Abx, she had h/o NPH and awaiting a trial of therapeutic LP some time in March.    In this admission she was seen by Neurology, neurosurgery, GI and PT who recomm home PT.     Her urine culture preliminary result shows > [id number] GNR, pending sensitivity. Bladder scan shows 175 cc. She will go home today with oral Ceftin 250 mg twice daily x 4 days. Spoke to Attending (Dr Alexandro Norton) and daughter at bedside.

## 2020-02-21 NOTE — DISCHARGE NOTE NURSING/CASE MANAGEMENT/SOCIAL WORK - PATIENT PORTAL LINK FT
You can access the FollowMyHealth Patient Portal offered by [facility] by registering at the following website: [email or web address]. By joining [facility]’s FollowMyHealth portal, you will also be able to view your health information using other applications (apps) compatible with our system.

## 2020-02-21 NOTE — PROGRESS NOTE ADULT - SUBJECTIVE AND OBJECTIVE BOX
SUBJECTIVE:  no acute events  no abdominal pain  _____________________________________________________  OBJECTIVE:    T(C): 36.7 (02-21-20 @ 08:18), Max: 36.7 (02-20-20 @ 15:59)  HR: 58 (02-21-20 @ 08:18)  BP: 148/70 (02-21-20 @ 08:18)  RR: 18 (02-21-20 @ 08:18)  SpO2: 96% (02-21-20 @ 08:18)  Wt(kg): --    02-20 @ 07:01  -  02-21 @ 07:00  --------------------------------------------------------  IN:    sodium chloride 0.9%.: 240 mL  Total IN: 240 mL    OUT:  Total OUT: 0 mL    Total NET: 240 mL        PHYSICAL EXAM:      Constitutional: nad    Gastrointestinal: soft ntnd +bs      _____________________________________________________  LABS:                        10.4   4.56  )-----------( 210      ( 21 Feb 2020 06:24 )             33.4     02-21    130<L>  |  97  |  15  ----------------------------<  99  4.2   |  22  |  0.67    Ca    9.0      21 Feb 2020 06:24    TPro  7.4  /  Alb  3.2<L>  /  TBili  0.3  /  DBili  x   /  AST  32  /  ALT  39  /  AlkPhos  119  02-21    LIVER FUNCTIONS - ( 21 Feb 2020 06:24 )  Alb: 3.2 g/dL / Pro: 7.4 g/dL / ALK PHOS: 119 U/L / ALT: 39 U/L / AST: 32 U/L / GGT: x           _____________________________________________________  ACTIVE MEDS:  MEDICATIONS  (STANDING):  azilsartan 40 milliGRAM(s) Oral every 24 hours  cefTRIAXone   IVPB 1000 milliGRAM(s) IV Intermittent every 24 hours  chlorhexidine 2% Cloths 1 Application(s) Topical daily  cholecalciferol 1000 Unit(s) Oral daily  escitalopram 10 milliGRAM(s) Oral daily  hydrALAZINE 50 milliGRAM(s) Oral daily  hydrALAZINE 100 milliGRAM(s) Oral <User Schedule>  influenza   Vaccine 0.5 milliLiter(s) IntraMuscular once  levothyroxine 125 MICROGram(s) Oral <User Schedule>  meclizine 12.5 milliGRAM(s) Oral daily  memantine ER 7 milliGRAM(s) Oral daily  metoprolol succinate  milliGRAM(s) Oral daily  rivaroxaban 10 milliGRAM(s) Oral daily  sodium chloride 0.9%. 1000 milliLiter(s) (60 mL/Hr) IV Continuous <Continuous>    MEDICATIONS  (PRN):  traMADol 25 milliGRAM(s) Oral three times a day PRN Moderate Pain (4 - 6)    _____________________________________________________  ASSESSMENT:  89yFemale with likely HCC (7 cm liver mass, hx of HCV treated, afp > 1000)  comfort care only per family    PLAN:  will sign off, please recall as needed    David Jimenez M.D.  [facility]  (O) [phone]

## 2020-02-21 NOTE — PROGRESS NOTE ADULT - REASON FOR ADMISSION
altered mental status, uti, hyponatremia, liver mass, abnormal LFTs

## 2020-02-21 NOTE — DISCHARGE NOTE PROVIDER - NSDCFUSCHEDAPPT_GEN_ALL_CORE_FT
ABNER WILLS ; 03/17/2020 ; NP [facility] [address]  ABNER WILLS ; 03/27/2020 ; NP [facility] [address] [facility] ABNER WILLS ; 03/17/2020 ; NP [facility] [address]  ABNER WILLS ; 03/27/2020 ; NP [facility] [address] [facility] ABNER WILLS ; 03/17/2020 ; NP [facility] [address]  ABNER WILLS ; 03/27/2020 ; NP [facility] [address] [facility] ABNER WILLS ; 03/17/2020 ; NP [facility] [address]  ABNER WILLS ; 03/27/2020 ; NP [facility] [address] [facility] ABNER WILLS ; 03/17/2020 ; NP [facility] [address]  ABNER WILLS ; 03/27/2020 ; NP [facility] [address] [facility] ABNER WILLS ; 03/17/2020 ; NP [facility] [address]  ABNER WILLS ; 03/27/2020 ; NP [facility] [address] [facility] ABNER WILLS ; 03/17/2020 ; NP [facility] [address]  ABNER WILLS ; 03/27/2020 ; NP [facility] [address] [facility]

## 2020-02-21 NOTE — DISCHARGE NOTE PROVIDER - NSDCCPCAREPLAN_GEN_ALL_CORE_FT
PRINCIPAL DISCHARGE DIAGNOSIS  Diagnosis: Altered mental status  Assessment and Plan of Treatment: resolved      SECONDARY DISCHARGE DIAGNOSES  Diagnosis: Liver mass  Assessment and Plan of Treatment: no intervention    Diagnosis: UTI (urinary tract infection)  Assessment and Plan of Treatment: complete Ceftin x 4 days as prescribed PRINCIPAL DISCHARGE DIAGNOSIS  Diagnosis: Altered mental status  Assessment and Plan of Treatment: resolved      SECONDARY DISCHARGE DIAGNOSES  Diagnosis: Normal pressure hydrocephalus  Assessment and Plan of Treatment: op follow up with Neurologist for possible lumbar puncture next month    Diagnosis: HTN (hypertension)  Assessment and Plan of Treatment: controlled, cont toprol xl    Diagnosis: Dementia  Assessment and Plan of Treatment: stable, cont namenda, lexapro    Diagnosis: Metabolic encephalopathy  Assessment and Plan of Treatment: resolved, now at base line    Diagnosis: Hyponatremia  Assessment and Plan of Treatment: improved, aldactone is on hold, out patient follow up with PCP    Diagnosis: Liver mass  Assessment and Plan of Treatment: no intervention    Diagnosis: UTI (urinary tract infection)  Assessment and Plan of Treatment: complete Ceftin x 4 days as prescribed

## 2020-02-21 NOTE — PROGRESS NOTE ADULT - SUBJECTIVE AND OBJECTIVE BOX
Patient is a 89y old  Female who presents with a chief complaint of altered mental status, uti, hyponatremia, liver mass, abnormal LFTs (21 Feb 2020 12:30)      SUBJECTIVE / OVERNIGHT EVENTS: no new c/o. MS at baseline, BP improved but still elevated, urine cx growing quinolone resistant E Coli    MEDICATIONS  (STANDING):  azilsartan 40 milliGRAM(s) Oral every 24 hours  cefTRIAXone   IVPB 1000 milliGRAM(s) IV Intermittent every 24 hours  chlorhexidine 2% Cloths 1 Application(s) Topical daily  cholecalciferol 1000 Unit(s) Oral daily  escitalopram 10 milliGRAM(s) Oral daily  hydrALAZINE 50 milliGRAM(s) Oral daily  hydrALAZINE 100 milliGRAM(s) Oral <User Schedule>  influenza   Vaccine 0.5 milliLiter(s) IntraMuscular once  levothyroxine 125 MICROGram(s) Oral <User Schedule>  meclizine 12.5 milliGRAM(s) Oral daily  memantine ER 7 milliGRAM(s) Oral daily  metoprolol succinate  milliGRAM(s) Oral daily  rivaroxaban 10 milliGRAM(s) Oral daily  sodium chloride 0.9%. 1000 milliLiter(s) (60 mL/Hr) IV Continuous <Continuous>    MEDICATIONS  (PRN):  traMADol 25 milliGRAM(s) Oral three times a day PRN Moderate Pain (4 - 6)      Vital Signs Last 24 Hrs  T(F): 98 (02-21-20 @ 08:18), Max: 98.1 (02-21-20 @ 00:15)  HR: 58 (02-21-20 @ 08:18) (58 - 68)  BP: 148/70 (02-21-20 @ 08:18) (142/58 - 186/76)  RR: 18 (02-21-20 @ 08:18) (18 - 18)  SpO2: 96% (02-21-20 @ 08:18) (96% - 96%)  Telemetry:   CAPILLARY BLOOD GLUCOSE        I&O's Summary    20 Feb 2020 07:01  -  21 Feb 2020 07:00  --------------------------------------------------------  IN: 240 mL / OUT: 0 mL / NET: 240 mL        PHYSICAL EXAM:  GENERAL: NAD, well-developed  HEAD:  Atraumatic, Normocephalic  EYES: EOMI, PERRLA, conjunctiva and sclera clear  NECK: Supple, No JVD  CHEST/LUNG: Clear to auscultation bilaterally; No wheeze  HEART: Regular rate and rhythm; No murmurs, rubs, or gallops  ABDOMEN: Soft, Nontender, Nondistended; Bowel sounds present  EXTREMITIES:  2+ Peripheral Pulses, No clubbing, cyanosis, or edema  PSYCH: AAOx3  NEUROLOGY: non-focal  SKIN: No rashes or lesions    LABS:                        10.4   4.56  )-----------( 210      ( 21 Feb 2020 06:24 )             33.4     02-21    130<L>  |  97  |  15  ----------------------------<  99  4.2   |  22  |  0.67    Ca    9.0      21 Feb 2020 06:24    TPro  7.4  /  Alb  3.2<L>  /  TBili  0.3  /  DBili  x   /  AST  32  /  ALT  39  /  AlkPhos  119  02-21              RADIOLOGY & ADDITIONAL TESTS:    Imaging Personally Reviewed:    Consultant(s) Notes Reviewed:      Care Discussed with Consultants/Other Providers:

## 2020-02-21 NOTE — DISCHARGE NOTE PROVIDER - NSDCMRMEDTOKEN_GEN_ALL_CORE_FT
azilsartan 40 mg oral tablet: 1 tab(s) orally every 24 hours  cefuroxime 250 mg oral tablet: 1 tab(s) orally every 12 hours  x 4 days  hydrALAZINE 100 mg oral tablet: 1 tab(s) orally 2 times a day - total daily dose 250mg  hydrALAZINE 50 mg oral tablet: 1 tab(s) orally once a day (in the morning) - total daily dose 250mg  Lexapro 10 mg oral tablet: 1 tab(s) orally once a day  meclizine 12.5 mg oral tablet: 0.5 tab(s) orally 2 times a day  memantine 7 mg oral capsule, extended release: 1 cap(s) orally once a day  Restasis 0.05% ophthalmic emulsion: 1 drop(s) to each affected eye once a day, As Needed  Synthroid 137 mcg (0.137 mg) oral tablet: 1 tab(s) orally once a day  NOTE: per PCP, wants to decrease dose to 125mcg daily starting 2/19/20  Toprol- mg oral tablet, extended release: 1 tab(s) orally once a day azilsartan 40 mg oral tablet: 1 tab(s) orally every 24 hours  cefuroxime 250 mg oral tablet: 1 tab(s) orally every 12 hours  x 4 days  cholecalciferol oral tablet: 1000 unit(s) orally once a day  hydrALAZINE 100 mg oral tablet: 1 tab(s) orally   hydrALAZINE 50 mg oral tablet: 1 tab(s) orally once a day  levothyroxine 125 mcg (0.125 mg) oral tablet: 1 tab(s) orally once a day   Lexapro 10 mg oral tablet: 1 tab(s) orally once a day  meclizine 12.5 mg oral tablet: 0.5 tab(s) orally 2 times a day  memantine 7 mg oral capsule, extended release: 1 cap(s) orally once a day  Restasis 0.05% ophthalmic emulsion: 1 drop(s) to each affected eye once a day, As Needed  Toprol- mg oral tablet, extended release: 1 tab(s) orally once a day azilsartan 40 mg oral tablet: 1 tab(s) orally every 24 hours  cefuroxime 250 mg oral tablet: 1 tab(s) orally every 12 hours  x 4 days  cholecalciferol oral tablet: 1000 unit(s) orally once a day  hydrALAZINE 100 mg oral tablet: 1 tab(s) orally   hydrALAZINE 50 mg oral tablet: 1 tab(s) orally once a day  levothyroxine 125 mcg (0.125 mg) oral tablet: 1 tab(s) orally once a day   Lexapro 10 mg oral tablet: 1 tab(s) orally once a day  meclizine 12.5 mg oral tablet: 0.5 tab(s) orally 2 times a day  memantine 7 mg oral capsule, extended release: 1 cap(s) orally once a day  out patient PT:   Restasis 0.05% ophthalmic emulsion: 1 drop(s) to each affected eye once a day, As Needed  Toprol- mg oral tablet, extended release: 1 tab(s) orally once a day

## 2020-02-21 NOTE — CHART NOTE - NSCHARTNOTEFT_GEN_A_CORE
Discussed with Dr. Norton  No Palliative care Consult required  to be removed from [facility] team list.  Please reconsult if issues arise

## 2020-02-26 PROBLEM — G91.2 (IDIOPATHIC) NORMAL PRESSURE HYDROCEPHALUS: Chronic | Status: ACTIVE | Noted: 2020-02-19

## 2020-02-26 PROBLEM — F03.90 UNSPECIFIED DEMENTIA WITHOUT BEHAVIORAL DISTURBANCE: Chronic | Status: ACTIVE | Noted: 2020-02-19

## 2020-03-03 ENCOUNTER — OUTPATIENT (OUTPATIENT)
Dept: OUTPATIENT SERVICES | Facility: HOSPITAL | Age: 85
LOS: 1 days | End: 2020-03-03
Payer: MEDICARE

## 2020-03-03 VITALS
SYSTOLIC BLOOD PRESSURE: 161 MMHG | HEART RATE: 73 BPM | DIASTOLIC BLOOD PRESSURE: 72 MMHG | TEMPERATURE: 99 F | HEIGHT: 58 IN | RESPIRATION RATE: 16 BRPM | OXYGEN SATURATION: 99 % | WEIGHT: 132.06 LBS

## 2020-03-03 DIAGNOSIS — Z01.818 ENCOUNTER FOR OTHER PREPROCEDURAL EXAMINATION: ICD-10-CM

## 2020-03-03 DIAGNOSIS — Z98.890 OTHER SPECIFIED POSTPROCEDURAL STATES: Chronic | ICD-10-CM

## 2020-03-03 DIAGNOSIS — Z87.440 PERSONAL HISTORY OF URINARY (TRACT) INFECTIONS: ICD-10-CM

## 2020-03-03 DIAGNOSIS — G91.2 (IDIOPATHIC) NORMAL PRESSURE HYDROCEPHALUS: ICD-10-CM

## 2020-03-03 DIAGNOSIS — Z29.9 ENCOUNTER FOR PROPHYLACTIC MEASURES, UNSPECIFIED: ICD-10-CM

## 2020-03-03 DIAGNOSIS — Z90.710 ACQUIRED ABSENCE OF BOTH CERVIX AND UTERUS: Chronic | ICD-10-CM

## 2020-03-03 DIAGNOSIS — Z96.642 PRESENCE OF LEFT ARTIFICIAL HIP JOINT: Chronic | ICD-10-CM

## 2020-03-03 LAB
ANION GAP SERPL CALC-SCNC: 12 MMOL/L — SIGNIFICANT CHANGE UP (ref 5–17)
BUN SERPL-MCNC: 20 MG/DL — SIGNIFICANT CHANGE UP (ref 7–23)
CALCIUM SERPL-MCNC: 9.2 MG/DL — SIGNIFICANT CHANGE UP (ref 8.4–10.5)
CHLORIDE SERPL-SCNC: 90 MMOL/L — LOW (ref 96–108)
CO2 SERPL-SCNC: 21 MMOL/L — LOW (ref 22–31)
CREAT SERPL-MCNC: 0.94 MG/DL — SIGNIFICANT CHANGE UP (ref 0.5–1.3)
GLUCOSE SERPL-MCNC: 120 MG/DL — HIGH (ref 70–99)
HCT VFR BLD CALC: 34 % — LOW (ref 34.5–45)
HGB BLD-MCNC: 10.4 G/DL — LOW (ref 11.5–15.5)
MCHC RBC-ENTMCNC: 24.9 PG — LOW (ref 27–34)
MCHC RBC-ENTMCNC: 30.6 GM/DL — LOW (ref 32–36)
MCV RBC AUTO: 81.5 FL — SIGNIFICANT CHANGE UP (ref 80–100)
MRSA PCR RESULT.: SIGNIFICANT CHANGE UP
NRBC # BLD: 0 /100 WBCS — SIGNIFICANT CHANGE UP (ref 0–0)
PLATELET # BLD AUTO: 224 K/UL — SIGNIFICANT CHANGE UP (ref 150–400)
POTASSIUM SERPL-MCNC: 4.5 MMOL/L — SIGNIFICANT CHANGE UP (ref 3.5–5.3)
POTASSIUM SERPL-SCNC: 4.5 MMOL/L — SIGNIFICANT CHANGE UP (ref 3.5–5.3)
RBC # BLD: 4.17 M/UL — SIGNIFICANT CHANGE UP (ref 3.8–5.2)
RBC # FLD: 14.2 % — SIGNIFICANT CHANGE UP (ref 10.3–14.5)
S AUREUS DNA NOSE QL NAA+PROBE: SIGNIFICANT CHANGE UP
SODIUM SERPL-SCNC: 123 MMOL/L — LOW (ref 135–145)
WBC # BLD: 9.06 K/UL — SIGNIFICANT CHANGE UP (ref 3.8–10.5)
WBC # FLD AUTO: 9.06 K/UL — SIGNIFICANT CHANGE UP (ref 3.8–10.5)

## 2020-03-03 PROCEDURE — 87640 STAPH A DNA AMP PROBE: CPT

## 2020-03-03 PROCEDURE — 85027 COMPLETE CBC AUTOMATED: CPT

## 2020-03-03 PROCEDURE — 80048 BASIC METABOLIC PNL TOTAL CA: CPT

## 2020-03-03 PROCEDURE — 87086 URINE CULTURE/COLONY COUNT: CPT

## 2020-03-03 PROCEDURE — 87641 MR-STAPH DNA AMP PROBE: CPT

## 2020-03-03 PROCEDURE — G0463: CPT

## 2020-03-03 RX ORDER — AZILSARTAN KAMEDOXOMIL 40 MG/1
1 TABLET ORAL
Qty: 0 | Refills: 0 | DISCHARGE

## 2020-03-03 RX ORDER — MECLIZINE HCL 12.5 MG
0.5 TABLET ORAL
Qty: 0 | Refills: 0 | DISCHARGE

## 2020-03-03 RX ORDER — MEMANTINE HYDROCHLORIDE 10 MG/1
1 TABLET ORAL
Qty: 0 | Refills: 0 | DISCHARGE

## 2020-03-03 RX ORDER — CYCLOSPORINE 0.5 MG/ML
1 EMULSION OPHTHALMIC
Qty: 0 | Refills: 0 | DISCHARGE

## 2020-03-03 RX ORDER — METOPROLOL TARTRATE 50 MG
1 TABLET ORAL
Qty: 0 | Refills: 0 | DISCHARGE

## 2020-03-03 RX ORDER — ESCITALOPRAM OXALATE 10 MG/1
1 TABLET, FILM COATED ORAL
Qty: 0 | Refills: 0 | DISCHARGE

## 2020-03-03 RX ORDER — AMLODIPINE BESYLATE 2.5 MG/1
1 TABLET ORAL
Qty: 0 | Refills: 0 | DISCHARGE

## 2020-03-03 NOTE — H&P PST ADULT - PRIMARY CARE PROVIDER
Dr. Braxton Poe [phone], fax [phone]/ Dr. Ebony Espana Dr. Braxton Poe [phone], fax [phone] will scheduled an appointment / Dr. Ebony Espana

## 2020-03-03 NOTE — H&P PST ADULT - NSICDXPROBLEM_GEN_ALL_CORE_FT
PROBLEM DIAGNOSES  Problem: NPH (normal pressure hydrocephalus)  Assessment and Plan: lumbar drain   PST instructions provided, surgical scrub given, patient and daughter verbalized understanding.   CBC, BMP, Urine cx and MRSA collected and sent.     Problem: Recent urinary tract infection  Assessment and Plan: will follow up on urine cx, if possitive contact PCP  patient restarted antibiotic as prescribed.   Patient instructed to contact PCP if contidion worsen or go to ER     Problem: Need for prophylactic measure  Assessment and Plan: The Caprini score indicates that this patient is at high risk for a VTE event (score 6 or greater). Surgical patients in this group will benefit from both pharmacologic prophylaxis and intermittent compression devices.  The surgical team will determine the balance between VTE risk and bleeding risk, and other clinical considerations PROBLEM DIAGNOSES  Problem: NPH (normal pressure hydrocephalus)  Assessment and Plan: lumbar drain   PST instructions provided, surgical scrub given, patient and daughter verbalized understanding.   CBC, BMP, Urine cx and MRSA collected and sent.     Problem: Recent urinary tract infection  Assessment and Plan: will follow up on urine cx, if positive  contact PCP  patient restarted antibiotic as prescribed.   Patient instructed to contact PCP if condition worsen or go to ER   Dr. Slaughter was emailed     Problem: Need for prophylactic measure  Assessment and Plan: The Caprini score indicates that this patient is at high risk for a VTE event (score 6 or greater). Surgical patients in this group will benefit from both pharmacologic prophylaxis and intermittent compression devices.  The surgical team will determine the balance between VTE risk and bleeding risk, and other clinical considerations

## 2020-03-03 NOTE — H&P PST ADULT - ASSESSMENT
SHRUTHII VTE 2.0 SCORE [CLOT updated 2019]    AGE RELATED RISK FACTORS                                                       MOBILITY RELATED FACTORS  [ ] Age 41-60 years                                            (1 Point)                    [ ] Bed rest                                                        (1 Point)  [ ] Age: 61-74 years                                           (2 Points)                  [ ] Plaster cast                                                   (2 Points)  [x ] Age= 75 years                                              (3 Points)                    [ ] Bed bound for more than 72 hours                 (2 Points)    DISEASE RELATED RISK FACTORS                                               GENDER SPECIFIC FACTORS  [ ] Edema in the lower extremities                       (1 Point)              [ ] Pregnancy                                                     (1 Point)  [ ] Varicose veins                                               (1 Point)                     [ ] Post-partum < 6 weeks                                   (1 Point)             [ x] BMI > 25 Kg/m2                                            (1 Point)                     [ ] Hormonal therapy  or oral contraception          (1 Point)                 [ ] Sepsis (in the previous month)                        (1 Point)               [ ] History of pregnancy complications                 (1 point)  [ ] Pneumonia or serious lung disease                                               [ ] Unexplained or recurrent                     (1 Point)           (in the previous month)                               (1 Point)  [ ] Abnormal pulmonary function test                     (1 Point)                 SURGERY RELATED RISK FACTORS  [ ] Acute myocardial infarction                              (1 Point)               [ ]  Section                                             (1 Point)  [ ] Congestive heart failure (in the previous month)  (1 Point)      [ ] Minor surgery                                                  (1 Point)   [ ] Inflammatory bowel disease                             (1 Point)               [ ] Arthroscopic surgery                                        (2 Points)  [ ] Central venous access                                      (2 Points)                [ x] General surgery lasting more than 45 minutes (2 points)  [ x] Malignancy- Present or previous                   (2 Points)                [ ] Elective arthroplasty                                         (5 points)    [ ] Stroke (in the previous month)                          (5 Points)                                                                                                                                                           HEMATOLOGY RELATED FACTORS                                                 TRAUMA RELATED RISK FACTORS  [ ] Prior episodes of VTE                                     (3 Points)                [ ] Fracture of the hip, pelvis, or leg                       (5 Points)  [ ] Positive family history for VTE                         (3 Points)             [ ] Acute spinal cord injury (in the previous month)  (5 Points)  [ ] Prothrombin 15043 A                                     (3 Points)               [ ] Paralysis  (less than 1 month)                             (5 Points)  [ ] Factor V Leiden                                             (3 Points)                  [ ] Multiple Trauma within 1 month                        (5 Points)  [ ] Lupus anticoagulants                                     (3 Points)                                                           [ ] Anticardiolipin antibodies                               (3 Points)                                                       [ ] High homocysteine in the blood                      (3 Points)                                             [ ] Other congenital or acquired thrombophilia      (3 Points)                                                [ ] Heparin induced thrombocytopenia                  (3 Points)                                     Total Score [    8      ]

## 2020-03-03 NOTE — H&P PST ADULT - NSANTHOSAYNRD_GEN_A_CORE
No. LUCSA screening performed.  STOP BANG Legend: 0-2 = LOW Risk; 3-4 = INTERMEDIATE Risk; 5-8 = HIGH Risk

## 2020-03-03 NOTE — H&P PST ADULT - ENERGY EXPENDITURE (METS)
2.96 due to recent balance issues ( prior hospitalization 2/2020 was able to walk, climb, house work and take care of self

## 2020-03-03 NOTE — H&P PST ADULT - NSICDXFAMILYHX_GEN_ALL_CORE_FT
FAMILY HISTORY:  Family history of colon cancer, son  Family history of colon cancer in mother    Sibling  Still living? Unknown  Family history of diabetes mellitus, Age at diagnosis: Age Unknown  Family history of leukemia, Age at diagnosis: Age Unknown

## 2020-03-03 NOTE — H&P PST ADULT - HISTORY OF PRESENT ILLNESS
88yo F presents w altered mental status, fatigue, urinary incontinence  In the Ed ptn was seen by Neuro/NSx 2/2 Sx possibly being to NPH. Ptn found to have UTI, Hyponatremia and mental status improved post IVF and IV ABx. No intervention offered by Nsurgery    PMH: HTN hypothyroidism, dementia, NPH, h/o Hepatits C, treated 5 years ago by a GI doctor in [place], post treatment viral load was zero as per daughter, ptn hasnt had follow up since. for neatrly a week ptn c/o abd pain , yesterday had an abd sono done and a liver mass was detected. ptn still c/o abd pain and as per daughter the pain has been constant. Ptn is confused and AxO to name and place only, smiling, calm. Frequent UTIs, unsteady gait, on and off vomiting for the past 8 months, undergoing a work up for NPH with NSX, Dr. Slaughter  no fever, no chills, no cough, no CP, no SOB, no change in BMs, no change in appetite, no weight loss. no HAs, No recent falls. No back pain. 88yo female with HTN, Hypothyroidism, treated HCV ( 5 yr ago), Dementia, recently hospitalized at [facility] 2/19-2/21 for UTI, hyponatremia, altered mental status, balance issues, normal pressure hydrocephalus, presents to [facility] for scheduled lumbar drain on 3/17/2020. Patient is AxOx3 but fatigued,  in hospital wheelchair , accompanied by daughter. Patient speaks Botswanan, interview obtain in native language.     According to daughter patient had low grade fever last night  and takes a long time to urinate, daughter brought urine specimen for culture testing ( clean catch ) .  Patient restarted her cefuroxime last night ( 250mg BID x 4 days as per PCP). Urine cx sent, patient will continue antibiotic as prescribed and obtain medical evaluation prior procedure. 89yo female with HTN, Hypothyroidism, treated HCV ( 5 yr ago), Dementia, recently hospitalized at [facility] 2/19-2/21 for UTI, hyponatremia, altered mental status, balance issues, normal pressure hydrocephalus, presents to [facility] for scheduled lumbar drain on 3/17/2020. Patient is AxOx3 but fatigued,  in hospital wheelchair , accompanied by daughter. Patient speaks Azerbaijani, interview obtain in native language.     According to daughter patient had low grade fever last night  and takes a long time to urinate, daughter brought urine specimen for culture testing ( clean catch ) .  Patient restarted her cefuroxime last night ( 250mg BID x 4 days as per PCP). Urine cx sent, patient will continue antibiotic as prescribed and obtain medical evaluation prior procedure. ( I emailed Dr. Slaughter )

## 2020-03-03 NOTE — H&P PST ADULT - REASON FOR ADMISSION
altered mental status, uti, hyponatremia, liver mass, abnormal LFTs balance and memory issues , fatigue

## 2020-03-03 NOTE — H&P PST ADULT - NSICDXPASTMEDICALHX_GEN_ALL_CORE_FT
PAST MEDICAL HISTORY:  Dementia     Dizziness     Hepatitis C virus infection, unspecified chronicity s/p treatment w post treatment virus load at 0, as per daughter  ( HCV due to h/o blood transfusion )    HTN (hypertension)     Liver mass     NPH (normal pressure hydrocephalus)     Recent urinary tract infection PAST MEDICAL HISTORY:  Dementia     Heart murmur     Hepatitis C virus infection, unspecified chronicity treated 5 yr ago,  virus load negative  ( HCV due to h/o blood transfusion )    HTN (hypertension)     Liver mass high AFP, mass found on recent abdominal CT    NPH (normal pressure hydrocephalus)     Osteoarthritis     Poor balance     Recent urinary tract infection

## 2020-03-03 NOTE — H&P PST ADULT - OTHER CARE PROVIDERS
card Dr. Rafael Rebolledo [phone] , GI Dr. Samantah Malhotra [phone] card Dr. Rafael Rebolledo [phone] last visit few years ago  , GI Dr. Samanhta Malhotra [phone]

## 2020-03-03 NOTE — H&P PST ADULT - LAST ECHOCARDIOGRAM
[facility] ( [facility] [place] ( [facility] ) few years ago , as per daughter no cardiac issues

## 2020-03-04 LAB
CULTURE RESULTS: SIGNIFICANT CHANGE UP
SPECIMEN SOURCE: SIGNIFICANT CHANGE UP

## 2020-03-17 ENCOUNTER — APPOINTMENT (OUTPATIENT)
Dept: SPINE | Facility: HOSPITAL | Age: 85
End: 2020-03-17

## 2020-03-24 PROBLEM — R01.1 CARDIAC MURMUR, UNSPECIFIED: Chronic | Status: ACTIVE | Noted: 2020-03-03

## 2020-03-24 PROBLEM — Z87.440 PERSONAL HISTORY OF URINARY (TRACT) INFECTIONS: Chronic | Status: ACTIVE | Noted: 2020-03-03

## 2020-03-24 PROBLEM — R16.0 HEPATOMEGALY, NOT ELSEWHERE CLASSIFIED: Chronic | Status: ACTIVE | Noted: 2020-03-03

## 2020-03-24 PROBLEM — B19.20 UNSPECIFIED VIRAL HEPATITIS C WITHOUT HEPATIC COMA: Chronic | Status: ACTIVE | Noted: 2017-02-06

## 2020-03-24 PROBLEM — M19.90 UNSPECIFIED OSTEOARTHRITIS, UNSPECIFIED SITE: Chronic | Status: ACTIVE | Noted: 2020-03-03

## 2020-03-24 PROBLEM — R26.89 OTHER ABNORMALITIES OF GAIT AND MOBILITY: Chronic | Status: ACTIVE | Noted: 2020-03-03

## 2020-03-27 ENCOUNTER — APPOINTMENT (OUTPATIENT)
Dept: SPINE | Facility: CLINIC | Age: 85
End: 2020-03-27

## 2020-05-05 ENCOUNTER — APPOINTMENT (OUTPATIENT)
Dept: HEPATOLOGY | Facility: CLINIC | Age: 85
End: 2020-05-05
Payer: MEDICARE

## 2020-05-05 DIAGNOSIS — C22.0 LIVER CELL CARCINOMA: ICD-10-CM

## 2020-05-05 PROCEDURE — 99443: CPT | Mod: CR

## 2020-05-05 RX ORDER — CLONIDINE 0.1 MG/24H
0.1 PATCH, EXTENDED RELEASE TRANSDERMAL
Qty: 4 | Refills: 0 | Status: ACTIVE | COMMUNITY
Start: 2020-03-24

## 2020-05-05 RX ORDER — MECLIZINE HYDROCHLORIDE 12.5 MG/1
12.5 TABLET ORAL
Refills: 0 | Status: DISCONTINUED | COMMUNITY
End: 2020-05-05

## 2020-05-05 RX ORDER — HYDRALAZINE HYDROCHLORIDE 25 MG/1
25 TABLET ORAL
Refills: 0 | Status: DISCONTINUED | COMMUNITY
End: 2020-05-05

## 2020-05-05 RX ORDER — MEMANTINE HYDROCHLORIDE 21 MG/1
21 CAPSULE, EXTENDED RELEASE ORAL
Qty: 30 | Refills: 0 | Status: ACTIVE | COMMUNITY
Start: 2020-03-06

## 2020-05-05 RX ORDER — SPIRONOLACTONE 25 MG/1
25 TABLET ORAL
Refills: 0 | Status: DISCONTINUED | COMMUNITY
End: 2020-05-05

## 2020-05-05 RX ORDER — TRAMADOL HYDROCHLORIDE 50 MG/1
50 TABLET, COATED ORAL
Qty: 30 | Refills: 0 | Status: ACTIVE | COMMUNITY
Start: 2020-04-12

## 2020-05-05 RX ORDER — ESCITALOPRAM OXALATE 10 MG/1
10 TABLET ORAL
Refills: 0 | Status: DISCONTINUED | COMMUNITY
End: 2020-05-05

## 2020-05-05 RX ORDER — ONDANSETRON 8 MG/1
8 TABLET ORAL
Qty: 60 | Refills: 0 | Status: ACTIVE | COMMUNITY
Start: 2020-05-01

## 2020-05-05 RX ORDER — CYCLOSPORINE 0.5 MG/ML
0.05 EMULSION OPHTHALMIC
Qty: 60 | Refills: 0 | Status: ACTIVE | COMMUNITY
Start: 2020-02-13

## 2020-05-05 NOTE — REVIEW OF SYSTEMS
[Abdominal Pain] : abdominal pain [Negative] : Heme/Lymph [FreeTextEntry7] : RUQ abdominal pain [FreeTextEntry2] : dementia with assistance with activities of daily living

## 2020-05-05 NOTE — ASSESSMENT
[FreeTextEntry1] : This elderly patient has had declining functionality with associated dementia, not responsive to intervention.  The patient is now found to have a large liver mass associated with rising alpha-fetoprotein suggesting the diagnosis of hepatocellular carcinoma.  The patient had a prior history of hepatitis C, which had been treated with a sustained viral response.  The patient's family or looking for treatment options to decide if they will be appropriate.  I will review her imaging studies and a multidisciplinary tumor conference to see if interventionalY-90 administration is feasible.  The question would be whether any intervention would palliate her abdominal pain and allow improvement in functionality.

## 2020-05-05 NOTE — HISTORY OF PRESENT ILLNESS
[Verbal consent obtained from patient] : the patient, [unfilled] [FreeTextEntry3] : Ebony Bergeron [de-identified] : This patient suffers from dementia and her visit is a telephone visit with her granddaughter, who is a family medicine physician and participates directly in her care along with the patient's daughter.  \par \par This patient was admitted to hospital in February of 2020 where a 7 cm mass was noted in the right lobe of the liver consistent with hepatocellular carcinoma.  The patient's alpha-fetoprotein was elevated to 1000 with normal aminotransferase values.  The patient has a prior history of hepatitis C having been treated for hepatitis C in the past and currently hepatitis C, RNA is negative.  It is believed that the patient is negative for hepatitis B.  The patient's hospitalization was for a decline in function right upper quadrant abdominal pain, poor appetite, and significant hyponatremia with a serum sodium of 123.  Serum sodium was corrected.  The patient is now an outpatient requires assistance in the activities of daily living including ambulating.\par \par The patient has consulted an oncologist and gastroenterologist, who have recommended.  No further intervention is appropriate.  The patient has been seen by palliative care and is being given tramadol for control of her right abdominal pain

## 2020-05-05 NOTE — REASON FOR VISIT
REFERRAL FORM  To be completed by Optometrist office      DATE OF APPOINTMENT:  TBT with Dr. Blankenship    PATIENT NAME:  Nay Fraser     PATIENT ADDRESS:  [address] [place] [id number]     PATIENT PHONE NUMBER:   [phone]    DIAGNOSIS: Nuclear sclerosis;Bilateral    REFRACTION:     Sphere Cylinder Axis Dist VA Add   Right -3.00 -1.25 112 20/30-2 +2.25   Left -0.25 -0.50 100 20/40-1+2 +2.25          KERATOMETRY:   K1 Axis K2 Axis   Right 44.75 118 46.00 028   Left 45.50 064 45.75 154          IOP:  OD:  12  OS:  11    REFERRING DOCTOR:     NAME:  Lesli Miller   ADDRESS:  [address] [place] [address]  [id number]   PHONE:   [phone]  FAX:  [phone]   [Initial Evaluation] : an initial evaluation [Family Member] : family member [FreeTextEntry1] : liver mass

## 2020-05-07 ENCOUNTER — APPOINTMENT (OUTPATIENT)
Dept: CT IMAGING | Facility: IMAGING CENTER | Age: 85
End: 2020-05-07

## 2020-05-14 ENCOUNTER — APPOINTMENT (OUTPATIENT)
Dept: HEPATOLOGY | Facility: CLINIC | Age: 85
End: 2020-05-14

## 2020-05-28 NOTE — ED ADULT NURSE NOTE - PRO INTERPRETER NEED 2
74 y/o male PMHx prostate cancer s/p resection, paroxysmal atrial fib no AC, L sided kidney stone found incidentally,  presents to the ED c/o constant waxing and waning R flank pain since 5AM this morning. Patient reported the pain is described to be a soreness, rated 7/10, and with associated nausea. Patient unsure if it was a muscle strain as he was doing heavy lifting. Patient gave himself an enema as he thought he was constipated and had minimal bowel movement. Patient has not taken any medication for pain prior to arrival. He denies fevers, chills, chest pain, SOB, cough, respiratory symptoms, vomiting, diarrhea, dizziness, headache, numbness, paresthesias, headache, hematuria, dysuria, urinary freq, rash, penile discharge, testicular pain. Patient reported he is prone to UTIs but urologist cannot perform cystoscopy as he has scar tissue from previous prostate surgery.   In ED pt w/ leukocytosis 12.53, otherwise labs unremarkable. UA w/ 35wbc leuk and nitrite. Pt received dose iv abx. On CT found to have 6mm R UPJ stone. Decision made to send pt to [facility]U for further monitoring and management Serbian

## 2020-07-06 NOTE — PATIENT PROFILE ADULT. - PRO SERVICES AT DISCH
unsure Ketoconazole Counseling:   Patient counseled regarding improving absorption with orange juice.  Adverse effects include but are not limited to breast enlargement, headache, diarrhea, nausea, upset stomach, liver function test abnormalities, taste disturbance, and stomach pain.  There is a rare possibility of liver failure that can occur when taking ketoconazole. The patient understands that monitoring of LFTs may be required, especially at baseline. The patient verbalized understanding of the proper use and possible adverse effects of ketoconazole.  All of the patient's questions and concerns were addressed.

## 2020-10-26 NOTE — ED ADULT NURSE NOTE - NSSISCREENINGQ4_ED_A_ED
"-- DO NOT REPLY / DO NOT REPLY ALL --  -- Message is from the [facility]--    COVID-19 Universal Screening: N/A - Not about scheduling    General Patient Message      Reason for Call: Patient called - needs to schedule appointment for a cat scan over at [facility].  Patient unable to schedule without a Approval Code from PCP  Please Call    Caller Information       Type Contact Phone    10/26/2020 12:18 PM CDT Phone (Incoming) Christina Neil (Self) [phone] (H)          Alternative phone number: none    Turnaround time given to caller: ""This message will be sent to [facility] Provider's name]. The clinical team will fulfill your request as soon as they review your message. \""    " No

## 2021-03-04 NOTE — ED PROVIDER NOTE - CARDIAC JVD
2100: Patient arrived from [facility], MD Hospitalist at bedside, plan to observe overnight and get a pacemaker in the morning. Patient alert and oriented, HR in 30s, stable BP, O2 Saturations good. Patient neurologically intact, mumble and is hard to understand due to lack of teeth, [place]. 250 NS bolus given per MD with meds.  Continuing to monitor.     2230: Patient becoming increasingly more agitated, rambling more and harder to understand. Patient still answering questions correctly but also seems to be hallucinating. MD made aware and ativan given to help with agitation.     0000: Patient completely delirious, moving all extremities, but no longer talking. Patient has developed stridor breath sounds and looks to be having difficulty breathing. O2 Saturations maintained. VS good. MD paged multiple times and is at bedside now to see patient. Wheezy breath sounds. Lasix orders, ABG being drawn, and nebs ordered. Will continue to monitor closely.     Gena Todd RN   
Paged Dr Duncan, pt is constipated, daughter stated that she takes Miralax daily. Orders placed for scheduled Miralax and Senna.    
Paged Dr Duncan. Pt is restless and appears slightly anxious.  Provider placed orders for Seroquel.  Also pt does not have any teeth, wondering if there can be a nutrition consult?  Provider placed orders for a nutritional consult.    
non-distended bilaterally

## 2021-05-03 NOTE — ED ADULT NURSE NOTE - NS ED NURSE LEVEL OF CONSCIOUSNESS MENTAL STATUS
If provider orders tests at today's visit, patient would like to be contacted via Telephone.  If to contact patient by phone, patient's preferred phone # is [phone] (Cell) and it is OK to leave message on voice mail or with family member.  If medications are ordered at today's visit, the pharmacy name/location patient would like them to be sent to is   [facility] [address] [place] [address] - [address]  [address]  [facility] [address]  Phone: [phone] Fax: [phone]         Alert/Cooperative

## 2021-06-01 NOTE — ED ADULT NURSE NOTE - NS ED NURSE LEVEL OF CONSCIOUSNESS ORIENTATION
Plastic & Reconstructive surgery    REASON FOR VISIT  Chief Complaint   Patient presents with   • Consultation     entropian right lower lid       Referred by Vinicius Ibrahim,*    HISTORY OF PRESENT ILLNESS    Eri Traore  is a 89 year old female with PMH significant for HTN, HLD, anxiety, and hearing loss who presents with right lower eyelid entropion. She saw her Optometrist on 5/12/2021 for continued irritation of right eye. She is on several  artificial tears.  She complains of “crystals” in both her eyes that she feels crunching when she rubs her eyes in both upper and lower eyelids. She also feels itching of scalp and entire face she states. Her vision is poor and she uses large magnification and has been told eyelid surgery will not improve her vision. She also dislikes the heaviness of her eyebrows       Their optometrist is Dr. Ibrahim and was last seen by them on 5/12/2021    Contacts - No  Glasses - magnification glasses  Dryness - No  Tearing - No  Pain - Yes irritation of eyes, right worse than left  Cataract - s/p surgery  Glaucoma - No  Retinal detachment - No  Macular degeneration - No  Thyroid Disease - No  Prior facial injury - No  Prior facial surgery - No  Eye ointments or drops - Yes  Autoimmune Disease - No    ANTICOAGULATION:  Aspirin no  Plavix no  Coumadin no  Supplements (fish Oil, vitamin E) no      MEDICAL HISTORY:  Past Medical History:   Diagnosis Date   • Anxiety    • Arthritis    • Cataract     surgeries bilaterally    • Diverticulosis    • HTN (hypertension)     refractory   • Trigeminal neuralgia    • UTI (urinary tract infection)        SURGICAL HISTORY:     Past Surgical History:   Procedure Laterality Date   • Appendectomy     • Carpal tunnel release  03/13/2013    L hand   • Discission,2nd cataract,laser  02/11/2003   • Hysterectomy      Patient has her ovaries    • Joint replacement      Total shoulder replacement   • Laminectomy     • Mammo screening bilateral   11/09/2012   • Shoulder surgery  07/30/2012    L shoulder       ALLERGIES:  ALLERGIES:  No Known Allergies    SOCIAL HISTORY:   Social History     Socioeconomic History   • Marital status:      Spouse name: Not on file   • Number of children: Not on file   • Years of education: Not on file   • Highest education level: Not on file   Tobacco Use   • Smoking status: Former Smoker   • Smokeless tobacco: Never Used   Substance and Sexual Activity   • Alcohol use: No   • Drug use: No     Social Determinants of Health     Financial Resource Strain:    • Social Determinants: Financial Resource Strain:    Food Insecurity:    • Social Determinants: Food Insecurity:    Transportation Needs:    • Lack of Transportation (Medical):    • Lack of Transportation (Non-Medical):    Physical Activity:    • Days of Exercise per Week:    • Minutes of Exercise per Session:    Stress:    • Social Determinants: Stress:    Social Connections:    • Social Determinants: Social Connections:    Intimate Partner Violence:    • Social Determinants: Intimate Partner Violence Past Fear:    • Social Determinants: Intimate Partner Violence Current Fear:        FAMILY HISTORY:  Family History   Problem Relation Age of Onset   • Stroke Mother    • Cancer Son    • Cancer Sister    • Stroke Brother    • Early death Son 2        Car accident      No family history of bleeding disorders, clotting disorders, or anesthetic complications.     REVIEW OF SYSTEMS  Eye problem(s): see above  ENT problem(s): negative  Cardiovascular problem(s): negative  Respiratory problem(s): negative  Gastrointestinal problem(s): negative  Genitourinary problem(s): negative  Musculoskeletal problem(s): negative  Integumentary problem(s): see above  Neurological problems(s): negative  Psychiatric problems(s): negative  Endocrine problem(s): negative  Hematological and/or Lymphatic problem(s): negative  Recent illnesses or infections: No  Feels generally well: No  History  of excessive bleeding or frequent bruising: No  History of wound dehiscence: No  History of hypertrophic or keloid scars: No  History of blood clots: No  History of unexplained weight loss or weight gain: No    CURRENT MEDICATIONS                Current Outpatient Medications   Medication   • triamcinolone (ARISTOCORT) 0.1 % cream   • furosemide (LASIX) 20 MG tablet   • sertraline (ZOLOFT) 50 MG tablet   • losartan (COZAAR) 100 MG tablet   • guanfacine (TENEX) 2 MG tablet   • DISPENSE   • hydrOXYzine (ATARAX) 10 MG tablet   • amLODIPine (NORVASC) 10 MG tablet   • minocycline (DYNACIN) 100 MG tablet   • ALPRAZolam (XANAX) 0.25 MG tablet   • doxycycline (MONODOX) 50 MG capsule   • guanfacine (TENEX) 2 MG tablet   • polyethylene glycol (MIRALAX) powder   • Calcium Carbonate-Vitamin D (CALCIUM 600+D PO)   • cetirizine (ZYRTEC) 10 MG tablet     No current facility-administered medications for this visit.       PHYSICAL EXAM  Visit Vitals  /80 (BP Location: RUE - Right upper extremity, Patient Position: Sitting, Cuff Size: Regular)   Pulse 60   Temp 96.8 °F (36 °C)   Ht 5' 5\" (1.651 m)   Wt 57.6 kg   BMI 21.13 kg/m²       General:  The patient is pleasant, cooperative, and comfortable in no apparent distress.  Neurologic:  Alert, oriented to person, place, and time.  Mood is pleasant and appropriate.  Musculoskeletal:  Ambulates without the use of an assistive device.  No obvious weakness or discoordination of the upper or lower extremities.  Pulmonary:  No coughing, wheezing, or straining  Cardiac:   Pulse regular.      Benítez type II skin of the face.    Brow position is poor with eyebrows below very prominent supraorbital rims  Upper eyelid skin is minimal redundant   Upper eyelid has minimal visible fat protrusion.    Upper excursion is good  Eyelid margin reflex distance is 4 mm bilaterally    Lower eyelid skin is moderate redundant, and atrophic  Lower eyelid has severe visible fat protrusion with skin  irritated along both  Lower eyelids  Snap back test demonstrates poor lower lid tone  Lateral canthus is in poor position  Lower lid/midface sulcus is poor with moderate midfacial descent   MRD 2 is 6 mm on right, 4 mm on left. Bilateral lower eyelid lid distraction is 9 mm bilaterally with poor snapback test. Right lower eyelid entropion obvious lashes rubbing on cornea, no major redness to sclera. Left lower eyelid ectropion with no corneal irritation.      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      The patient was counseled about the technique of the procedure, expected short and long-term recovery as well as a potential for adverse events.  The procedure can be performed under local anesthesia with or without intravenous sedation as an outpatient surgical procedure in the operating room.  IF she undergoes lower eyelid blepharoplasty this would be a general anesthetic.   I discussed the least invasive would be the only right lower eyelid entropion repair, the next would be a bilateral lower eyelid tightening and the most invasive would be bilateral lower eyelid tightening right entropion repair and the lower eyelid blepharoplasty.  I think at her age just addressing the lower eyelid entropion would be appropriate. Patient understands continued observation is also possibility although that will likely not improve her symptoms.    I did discuss that with the swelling and bruising she would need help postop for at minimum a week.    I spent a total of 60 minutes on the day of the visit.  This includes pre-charting, chart review and documenting.  PLAN  1. Will send right entropion repair (67924-[id number]), Left ectropion repair (05163-gt), and lower eyelid blepharoplasty (32533-52) to prior auth. Her niece Monika who is power of  will discuss  Oriented - self; Oriented - place; Oriented - time further with her and make decisions after hearing from insurance.  2. Pictures were taken today after signed consent.  3. She will need preop clearance by PCP prior to proceeding with surgery.      Today with Eri Traore  and her niece Monika Colon, power of , I reviewed the indications, risks, benefits, and alternatives of the proposed surgical procedure including including but not limited to anesthesia risks, swelling and bruising, bleeding from the incision lines, dryness to the eyes, sensitivity to sun or other bright light, difficulty closing eyes, infection, lid lag,  temporary or even permanent change in vision, and very rare chance of blindness, loss of the eye, changes in skin sensation or numbness of the eyelashes, asymmetry, under correction, overcorrection, recurrence of entropion, failure to obtain the desired result, possible need for revision surgery, pain, which may persist, and unfavorable scarring.    We also discussed additional risks of lower lid blepharoplasty including above stated plus ectropion, an outward rolling of the lower eyelid,a pulling down of the lower eyelid.  I did discuss the complication of lower lid malposition and the need for possible further operations to correct and the damage this may cause on the cornea along with dry eye and irritation.    General risks associated with surgery were discussed with the patient.  They include but are not limited to anesthesia, deep vein thrombosis, pulmonary embolus, injury to blood vessels and nerves, paralysis, stroke, heart attack, and death.    We also discussed postop expectations to include:    ACTIVITY   · Rest today.  A responsible adult needs to oversee your care today.  · Sleep with the head of bed elevated  more than 40 degrees to decrease swelling around the eyes for at least 10 days.   When you sleep, prop your head up on three or more pillows (sleeping in a recliner works well too).  Keeping your head above your  heart will help decrease soreness and swelling.  · ICE and Cold compresses should be used for at least 20 minutes every hour during the first 3 days after surgery.  This helps lessen swelling and bruising. Frozen peas work well too.  · I encourage easy walking to decrease the chance of blood clots in your legs.   · Simple everyday tasks are permissible 3-4 days after surgery.   · However, please avoid vigorous exercise, lifting more than 10 lbs, or activity that increases your heart rate for the first 2 weeks following surgery.  · Avoid bending, stooping, forceful coughing and sneezing for 2 weeks.  · You may be able to resume non-strenuous social activities 10-14 days after surgery.    · 6 weeks after surgery you may resume all activities as tolerated provided healing has been uneventful.  Dr. Mcwilliams will notify you when this is possible.  · Do not work or be in a cristina or dirty place for ten days.  · No swimming for three weeks.  · Do not operate machinery or appliances, make any important decisions, or drink alcohol for the next 24 hours.    · No driving for at least 7-10 days until you are feeling well, no longer taking pain medications, and your vision is completely clear.  · If there are any activities that you are unsure about, check with your doctor.  ·  Do not wear contacts or eye make-up for 3 weeks after surgery.   ·  If you are able to safely discontinue your CPAP for the first 3 nights it would be ideal.     DRESSINGS and INCISIONS    · I encourage you to wash your face with soap and water directly over the incisions starting 2 days after surgery.   Clean the area with a warm moist cloth--DO NOT RUB THE AREA, only gently blot. You will have steri strips placed over the suture, do not remove these or scrub this area.  · Shower neck down, wash hair backwards, and keep water out of eyes for the first week.  · Dressings are not required  · Occasionally, there may be some minor bleeding.  If this happens,  apply direct pressure on the involved area for 5-10 minutes.    · Eyelid swelling and bruising is normal.  Notify Dr. Mcwilliams IMMEDIATELY if the swelling is severe and feels hard like a baseball.  It may take one month for the eyelids to regain their strength and completely close after surgery.  · GOOD HANDWASHING PRIOR TO USING EYE OINTMENT.    SUTURES    · Sutures will be removed 5-7 days after surgery.    MEDICATIONS   · Apply the Erythromycin ointment two times a day to surgical area, (morning & bedtime), by placing a thin strip over the suture.  If your eye(s) feel dry or scratchy, you may apply erythromycin ointment or a lubricant in the eye as needed during the day for comfort - may do more often if eyes feel scratchy. The ointment will blur your vision but is safe to use in the eye. Artificial tears may also be used as needed during the day and night.  · Apply Erythromycin ointment in your eyes every evening before going to bed.  The eye ointment will make your vision blurry.  · Narcotic pain pills are not prescribed for this procedure.  · Use Tylenol (regular or extra strength) for discomfort as directed on the bottle.   · Do not take any aspirin or anti-inflammatory products (I.e. ibuprofen) for 7 days or until approved by Dr. Mcwilliams.     DIET  · You may resume your normal diet as tolerated.  · Do not have any alcoholic beverages for the next 48 hours.  They do not mix with the anesthesia and may make you sick.    SURGERY AND SMOKING  Research and experience with smoking patients have shown the supply of blood to small capillaries in the tissue is reduced, so the risk of bad healing, failure to heal, or even death of some tissue, increased scarring or other complication increases.    WHAT TO EXPECT  · Pain, discomfort, swelling and bruising should begin to improve within 7-14 days after surgery.    · It may take one month for the eyelids to regain their strength and completely close after surgery.            Yisel Mcwilliams MD MS  Plastic and Reconstructive Surgery

## 2021-10-19 NOTE — DISCHARGE NOTE ADULT - MEDICATION SUMMARY - MEDICATIONS TO TAKE
Impression: Long term (current) use of oral antidiabetic drugs: Z79.84. Plan: See above plan. I will START or STAY ON the medications listed below when I get home from the hospital:    traMADol 50 mg oral tablet  -- 1 tab(s) by mouth every 12 hours, As needed, Moderate Pain (4 - 6) MDD:2  -- Indication: For back pain/compression fracture     LORazepam 0.5 mg oral tablet  -- 1 tab(s) by mouth once a day (at bedtime)  -- Indication: For anxiety    Lexapro 5 mg oral tablet  -- 1 tab(s) by mouth once a day  -- Indication: For Depression     Coreg 6.25 mg oral tablet  -- 1 tab(s) by mouth 2 times a day MDD:2 tab  -- Indication: For HTN (hypertension)    ibandronate 150 mg oral tablet  -- 1 tab(s) by mouth once a month  -- Indication: For osteoporosis     amLODIPine 5 mg oral tablet  -- 1 tab(s) by mouth once a day  -- Indication: For HTN (hypertension)    Creon  --  by mouth   -- Indication: For mal absorption     Synthroid 100 mcg (0.1 mg) oral tablet  -- 1 tab(s) by mouth once a day  -- Indication: For Hypothyroid    Vitamin D3  --  by mouth   -- Indication: For Nutriton

## 2022-02-11 NOTE — H&P ADULT - HISTORY OF PRESENT ILLNESS
[id number] 87yo Bangladeshi-speaking female from home, walks with walker, HHA 9 hours Sun-Fri, PMH HTN, depression, Hypothyroidism, chronic dizziness (on meclizine), h/o Hepatitis C (treated in 2016), hypothyroidism and PSH of hysterectomy and L hip replacement presented with mechanical fall in AM. Patient was trying to get out of bed and use the commode and fell down hitting her back. Denies loss of consciousness, dizziness, palpitations and chest pain.  Patient had recent cath in january for shortness of breath and was told that is normal. Discussed with granddaughter at bedside, who is a doctor also.   Sh: non smoker, non alcoholic, denies illicit drug use. Lives alone with HHA. FH: Colon cancer in mother and son.

## 2022-07-19 NOTE — DISCHARGE NOTE ADULT - NS AS DC STROKE DX YN
Raj Dent (: 1951) is a 70 y.o. female, established patient, here for evaluation of the following chief complaint(s):  Grief Counseling (pt states  of 62 years recently passed away 2022 and 2 weeks prior to this her brother passed away ) and Cholesterol Problem (follow up)       ASSESSMENT/PLAN:  Below is the assessment and plan developed based on review of pertinent history, physical exam, labs, studies, and medications. 1. Grief  2. Primary osteoarthritis of right knee  -     lidocaine (LIDODERM) 5 %; Apply 1 patch to the affected area for 12 hours a day and remove for 12 hours a day., Normal, Disp-30 Each, R-11  3. Chronic pain of both knees  -     lidocaine (LIDODERM) 5 %; Apply 1 patch to the affected area for 12 hours a day and remove for 12 hours a day., Normal, Disp-30 Each, R-11  4. Mixed hyperlipidemia  -     icosapent ethyL (VASCEPA) 1 gram capsule; Take 2 Capsules by mouth two (2) times a day., Normal, Disp-360 Capsule, R-3  5. Encounter for screening mammogram for breast cancer  -     [facility] MAMMO BI SCREENING INCL CAD; Future      Return in about 3 months (around 10/20/2022) for OV- CHOL, Grief fu. Pt asked to complete follow by next visit: continue current plan. Allow self TIME to heal and grieve. Keep appts with psych and therapist as planned. SUBJECTIVE/OBJECTIVE:  HPI    Pt presents to f/u HTN & CHOL. Taking Caduet 10-80 mg. Denies side effects from medication. Feels sad after brother passed and most recently her  who was in hospice for metastatic cancer. Concerned since family is crowding her. Continues taking lexapro and remeron, but had ativan lowered to 0.25 mg before that, raised to 0.5 mg Lorazepam again with most recent. Had Ms. Whitney Colunga who helps out and started seeing her a few weeks ago. Will see therapist this week for phone visit, but first visit.   BP Readings from Last 3 Encounters:   22 127/76   10/12/21 (!) 145/67   21 127/75     YULY 2/7 10/12/2021 2/25/2021   Feeling nervous, anxious or on edge? - 2   Not being able to stop or control worrying? - 1   YULY-2 Subtotal - 3   Worrying too much about different things? - 3   Trouble relaxing? - 1   Being so restless that it is hard to sit still? - 1   Becoming easily annoyed or irritable? - 3   Feeling afraid as if something awful might happen? - 2   YULY-7 Total Score - 13   YULY-7 Result - Moderate Anxiety   If you checked off any problems, how difficult have these problems made it for you to do your work, take care of thinks at home, or get along with other people?   - Somewhat difficult   Feeling nervous, anxious, or on edge 3 -   Not being able to stop or control worrying 2 -   Worrying too much about different things 3 -   Trouble relaxing 1 -   Being so restless that it is hard to sit still 3 -   Becoming easily annoyed or irritable 2 -   Feeling afraid as if something awful might happen 1 -   YULY-7 Total Score 15 -     3 most recent PHQ Screens 7/19/2022   PHQ Not Done -   Little interest or pleasure in doing things Nearly every day   Feeling down, depressed, irritable, or hopeless Nearly every day   Total Score PHQ 2 6   Trouble falling or staying asleep, or sleeping too much More than half the days   Feeling tired or having little energy Several days   Poor appetite, weight loss, or overeating Several days   Feeling bad about yourself - or that you are a failure or have let yourself or your family down Several days   Trouble concentrating on things such as school, work, reading, or watching TV Not at all   Moving or speaking so slowly that other people could have noticed; or the opposite being so fidgety that others notice Not at all   Thoughts of being better off dead, or hurting yourself in some way Not at all   PHQ 9 Score 11   How difficult have these problems made it for you to do your work, take care of your home and get along with others Very difficult         Review of Systems  Constitutional: negative for fevers, chills, anorexia and weight loss  Respiratory:  negative for cough, hemoptysis, dyspnea, and wheezing  CV:   negative for chest pain, palpitations, and lower extremity edema  GI:   negative for nausea, vomiting, diarrhea, abdominal pain, and melena  Endo:               negative for polyuria,polydipsia,polyphagia, and heat intolerance  Genitourinary: negative for frequency, urgency, dysuria, retention, and hematuria  Integument:  negative for rash, ulcerations, and pruritus  Hematologic:  negative for easy bruising and bleeding  Musculoskel: Knee arthralgias, helped by lidocaine, needs refill. negative for muscle weakness,and joint pain/swelling  Neurological:  negative for headaches, dizziness, vertigo,and memory/gait problems  Behavl/Psych: negative for feelings of anxiety, depression, suicide, and mood changes    Visit Vitals  /76 (BP 1 Location: Left upper arm, BP Patient Position: Sitting, BP Cuff Size: Large adult)   Pulse 78   Temp 97 °F (36.1 °C) (Temporal)   Resp 18   Ht 5' 2\" (1.575 m)   Wt 177 lb (80.3 kg)   SpO2 97%   BMI 32.37 kg/m²       Wt Readings from Last 3 Encounters:   07/19/22 177 lb (80.3 kg)   10/12/21 178 lb (80.7 kg)   02/25/21 166 lb (75.3 kg)         Physical Exam:   General appearance - alert, well appearing, and in no distress. Mental status - A/O x 4,sad mood and affect. Tearful while discussing loss of . Chest - CTA. Symmetric chest rise. No wheezing. No distress. Heart - Normal rate & rhythm. Normal S1 & S2. No MGR. Abdomen- Soft, round. Non-distended, NT. No pulsatile masses or hernias. Ext-  No pedal edema, clubbing, or cyanosis. Skin-Warm and dry. No hyperpigmentation, ulcerations, or suspicious lesions. Neuro - Normal speech, no focal findings or movement disorder. Normal strength, gait, and muscle tone. An electronic signature was used to authenticate this note.   -- Anthony Middleton NP no

## 2022-09-27 NOTE — PATIENT PROFILE ADULT - DO YOU FEEL UNSAFE AT SCHOOL?
I have reviewed the patients controlled substance prescription history, as maintained in the [place] prescription monitoring program, so that the prescription(s) for a  controlled substance can be given
no

## 2022-12-15 NOTE — H&P ADULT - NSHPSOURCEINFORD_GEN_ALL_CORE
Patient/Child Consent: Written consent was obtained and risks were reviewed including but not limited to scarring, infection, bleeding, scabbing, incomplete removal, nerve damage and allergy to anesthesia.

## 2023-02-13 NOTE — PHYSICAL THERAPY INITIAL EVALUATION ADULT - GAIT DISTANCE, PT EVAL
Next Appointment: 12/22/23   Last seen:  12/21/22  Last refill:  12/21/22    Failed per refill protocol, please advise.     50 feet

## 2025-04-09 NOTE — PATIENT PROFILE ADULT - INTERPRETER NAME
Patient presents today for long catheter Exchange. Per Dr. Lynn verbal orders, procedure explained to patient. 14F coude long removed after deflation of balloon.Patient prepped with betadine and a 14 F coude Slovenian catheter was placed without difficulty under sterile technique. 10 mls of Normal Saline used to fill the anchoring balloon. Attached catheter to a leg-bag per patient request. Catheter secured to leg with stat lock and leg strap. Patient tolerated procedure. Will come back in 4 weeks for next catheter change.   
Laurie